# Patient Record
Sex: FEMALE | Race: WHITE | NOT HISPANIC OR LATINO | Employment: FULL TIME | URBAN - METROPOLITAN AREA
[De-identification: names, ages, dates, MRNs, and addresses within clinical notes are randomized per-mention and may not be internally consistent; named-entity substitution may affect disease eponyms.]

---

## 2017-03-21 ENCOUNTER — CONVERSION ENCOUNTER (OUTPATIENT)
Dept: GENERAL RADIOLOGY | Facility: HOSPITAL | Age: 57
End: 2017-03-21

## 2018-02-05 ENCOUNTER — OFFICE VISIT CONVERTED (OUTPATIENT)
Dept: ONCOLOGY | Facility: HOSPITAL | Age: 58
End: 2018-02-05
Attending: NURSE PRACTITIONER

## 2018-06-28 ENCOUNTER — CONVERSION ENCOUNTER (OUTPATIENT)
Dept: GENERAL RADIOLOGY | Facility: HOSPITAL | Age: 58
End: 2018-06-28

## 2020-03-20 ENCOUNTER — HOSPITAL ENCOUNTER (OUTPATIENT)
Dept: LAB | Facility: HOSPITAL | Age: 60
Discharge: HOME | End: 2020-03-20
Attending: PHYSICIAN ASSISTANT

## 2020-03-20 LAB
25(OH)D3 SERPL-MCNC: 53.9 NG/ML (ref 30–100)
ALBUMIN SERPL-MCNC: 3.8 G/DL (ref 3.5–5)
ALBUMIN/GLOB SERPL: 1.2 {RATIO} (ref 1.4–2.6)
ALP SERPL-CCNC: 133 U/L (ref 53–141)
ALT SERPL-CCNC: 19 U/L (ref 10–40)
ANION GAP SERPL CALC-SCNC: 17 MMOL/L (ref 8–19)
AST SERPL-CCNC: 28 U/L (ref 15–50)
BASOPHILS # BLD AUTO: 0.08 10*3/UL (ref 0–0.2)
BASOPHILS NFR BLD AUTO: 0.8 % (ref 0–3)
BILIRUB SERPL-MCNC: 0.57 MG/DL (ref 0.2–1.3)
BUN SERPL-MCNC: 26 MG/DL (ref 5–25)
BUN/CREAT SERPL: 28 {RATIO} (ref 6–20)
CALCIUM SERPL-MCNC: 9.7 MG/DL (ref 8.7–10.4)
CHLORIDE SERPL-SCNC: 98 MMOL/L (ref 99–111)
CHOLEST SERPL-MCNC: 181 MG/DL (ref 107–200)
CHOLEST/HDLC SERPL: 4.2 {RATIO} (ref 3–6)
CONV ABS IMM GRAN: 0.02 10*3/UL (ref 0–0.2)
CONV CO2: 27 MMOL/L (ref 22–32)
CONV IMMATURE GRAN: 0.2 % (ref 0–1.8)
CONV TOTAL PROTEIN: 7.1 G/DL (ref 6.3–8.2)
CREAT UR-MCNC: 0.93 MG/DL (ref 0.5–0.9)
DEPRECATED RDW RBC AUTO: 42.5 FL (ref 36.4–46.3)
EOSINOPHIL # BLD AUTO: 0.17 10*3/UL (ref 0–0.7)
EOSINOPHIL # BLD AUTO: 1.8 % (ref 0–7)
ERYTHROCYTE [DISTWIDTH] IN BLOOD BY AUTOMATED COUNT: 13.2 % (ref 11.7–14.4)
GFR SERPLBLD BASED ON 1.73 SQ M-ARVRAT: >60 ML/MIN/{1.73_M2}
GLOBULIN UR ELPH-MCNC: 3.3 G/DL (ref 2–3.5)
GLUCOSE SERPL-MCNC: 95 MG/DL (ref 65–99)
HCT VFR BLD AUTO: 49.5 % (ref 37–47)
HDLC SERPL-MCNC: 43 MG/DL (ref 40–60)
HGB BLD-MCNC: 16.3 G/DL (ref 12–16)
LDLC SERPL CALC-MCNC: 115 MG/DL (ref 70–100)
LYMPHOCYTES # BLD AUTO: 4.13 10*3/UL (ref 1–5)
LYMPHOCYTES NFR BLD AUTO: 43.5 % (ref 20–45)
MCH RBC QN AUTO: 28.9 PG (ref 27–31)
MCHC RBC AUTO-ENTMCNC: 32.9 G/DL (ref 33–37)
MCV RBC AUTO: 87.8 FL (ref 81–99)
MONOCYTES # BLD AUTO: 0.74 10*3/UL (ref 0.2–1.2)
MONOCYTES NFR BLD AUTO: 7.8 % (ref 3–10)
NEUTROPHILS # BLD AUTO: 4.35 10*3/UL (ref 2–8)
NEUTROPHILS NFR BLD AUTO: 45.9 % (ref 30–85)
NRBC CBCN: 0 % (ref 0–0.7)
OSMOLALITY SERPL CALC.SUM OF ELEC: 293 MOSM/KG (ref 273–304)
PLATELET # BLD AUTO: 270 10*3/UL (ref 130–400)
PMV BLD AUTO: 11.3 FL (ref 9.4–12.3)
POTASSIUM SERPL-SCNC: 3.2 MMOL/L (ref 3.5–5.3)
RBC # BLD AUTO: 5.64 10*6/UL (ref 4.2–5.4)
SODIUM SERPL-SCNC: 139 MMOL/L (ref 135–147)
T4 FREE SERPL-MCNC: 1.6 NG/DL (ref 0.9–1.8)
TRIGL SERPL-MCNC: 115 MG/DL (ref 40–150)
TSH SERPL-ACNC: 0.03 M[IU]/L (ref 0.27–4.2)
URATE SERPL-MCNC: 7.4 MG/DL (ref 2.5–7.5)
VIT B12 SERPL-MCNC: 1155 PG/ML (ref 211–911)
VLDLC SERPL-MCNC: 23 MG/DL (ref 5–37)
WBC # BLD AUTO: 9.49 10*3/UL (ref 4.8–10.8)

## 2020-03-23 LAB
CONV ANTI MICROSOMAL AB: <9 IU/ML (ref 0–34)
T3FREE SERPL-MCNC: 4.3 PG/ML (ref 2–4.4)
T3REVERSE SERPL-MCNC: 27.1 NG/DL (ref 9.2–24.1)
THYROGLOBULIN ANTIBODY: <1 IU/ML (ref 0–0.9)

## 2020-08-14 ENCOUNTER — HOSPITAL ENCOUNTER (OUTPATIENT)
Dept: LAB | Facility: HOSPITAL | Age: 60
Discharge: HOME | End: 2020-08-14
Attending: PHYSICIAN ASSISTANT

## 2020-08-14 LAB
T4 FREE SERPL-MCNC: 1.5 NG/DL (ref 0.9–1.8)
TSH SERPL-ACNC: 0.28 M[IU]/L (ref 0.27–4.2)

## 2020-08-17 LAB — T3FREE SERPL-MCNC: 2.9 PG/ML (ref 2–4.4)

## 2020-08-21 LAB — T3REVERSE SERPL-MCNC: 30 NG/DL (ref 9.2–24.1)

## 2020-08-31 ENCOUNTER — HOSPITAL ENCOUNTER (OUTPATIENT)
Dept: LAB | Facility: HOSPITAL | Age: 60
Discharge: HOME | End: 2020-08-31
Attending: PHYSICIAN ASSISTANT

## 2020-08-31 LAB
ASO AB SERPL-ACNC: 20 [IU]/ML (ref 0–200)
ERYTHROCYTE [SEDIMENTATION RATE] IN BLOOD: 20 MM/H (ref 0–30)

## 2020-09-01 LAB
DSDNA AB SER-ACNC: NEGATIVE [IU]/ML
ENA AB SER IA-ACNC: NEGATIVE {RATIO}
HCYS SERPL-SCNC: 13.3 UMOL/L (ref 3.7–13.9)

## 2020-09-02 LAB — CCP IGA+IGG SERPL IA-ACNC: 46 UNITS (ref 0–19)

## 2020-10-29 ENCOUNTER — HOSPITAL ENCOUNTER (OUTPATIENT)
Dept: LAB | Facility: HOSPITAL | Age: 60
Discharge: HOME | End: 2020-10-29
Attending: PHYSICIAN ASSISTANT

## 2020-10-29 LAB
25(OH)D3 SERPL-MCNC: 51.6 NG/ML (ref 30–100)
ALBUMIN SERPL-MCNC: 4 G/DL (ref 3.5–5)
ALBUMIN/GLOB SERPL: 1.3 {RATIO} (ref 1.4–2.6)
ALP SERPL-CCNC: 190 U/L (ref 53–141)
ALT SERPL-CCNC: 23 U/L (ref 10–40)
ANION GAP SERPL CALC-SCNC: 15 MMOL/L (ref 8–19)
AST SERPL-CCNC: 27 U/L (ref 15–50)
BASOPHILS # BLD AUTO: 0.09 10*3/UL (ref 0–0.2)
BASOPHILS NFR BLD AUTO: 0.9 % (ref 0–3)
BILIRUB SERPL-MCNC: 0.31 MG/DL (ref 0.2–1.3)
BUN SERPL-MCNC: 14 MG/DL (ref 5–25)
BUN/CREAT SERPL: 18 {RATIO} (ref 6–20)
CALCIUM SERPL-MCNC: 9.3 MG/DL (ref 8.7–10.4)
CHLORIDE SERPL-SCNC: 101 MMOL/L (ref 99–111)
CHOLEST SERPL-MCNC: 198 MG/DL (ref 107–200)
CHOLEST/HDLC SERPL: 4.6 {RATIO} (ref 3–6)
CONV ABS IMM GRAN: 0.02 10*3/UL (ref 0–0.2)
CONV CO2: 27 MMOL/L (ref 22–32)
CONV IMMATURE GRAN: 0.2 % (ref 0–1.8)
CONV TOTAL PROTEIN: 7.1 G/DL (ref 6.3–8.2)
CREAT UR-MCNC: 0.79 MG/DL (ref 0.5–0.9)
DEPRECATED RDW RBC AUTO: 43.9 FL (ref 36.4–46.3)
EOSINOPHIL # BLD AUTO: 0.91 10*3/UL (ref 0–0.7)
EOSINOPHIL # BLD AUTO: 9.4 % (ref 0–7)
ERYTHROCYTE [DISTWIDTH] IN BLOOD BY AUTOMATED COUNT: 13.5 % (ref 11.7–14.4)
GFR SERPLBLD BASED ON 1.73 SQ M-ARVRAT: >60 ML/MIN/{1.73_M2}
GLOBULIN UR ELPH-MCNC: 3.1 G/DL (ref 2–3.5)
GLUCOSE SERPL-MCNC: 106 MG/DL (ref 65–99)
HCT VFR BLD AUTO: 50.4 % (ref 37–47)
HDLC SERPL-MCNC: 43 MG/DL (ref 40–60)
HGB BLD-MCNC: 16.6 G/DL (ref 12–16)
LDLC SERPL CALC-MCNC: 123 MG/DL (ref 70–100)
LYMPHOCYTES # BLD AUTO: 3.67 10*3/UL (ref 1–5)
LYMPHOCYTES NFR BLD AUTO: 38.1 % (ref 20–45)
MCH RBC QN AUTO: 29.4 PG (ref 27–31)
MCHC RBC AUTO-ENTMCNC: 32.9 G/DL (ref 33–37)
MCV RBC AUTO: 89.2 FL (ref 81–99)
MONOCYTES # BLD AUTO: 0.8 10*3/UL (ref 0.2–1.2)
MONOCYTES NFR BLD AUTO: 8.3 % (ref 3–10)
NEUTROPHILS # BLD AUTO: 4.14 10*3/UL (ref 2–8)
NEUTROPHILS NFR BLD AUTO: 43.1 % (ref 30–85)
NRBC CBCN: 0 % (ref 0–0.7)
OSMOLALITY SERPL CALC.SUM OF ELEC: 289 MOSM/KG (ref 273–304)
PLATELET # BLD AUTO: 224 10*3/UL (ref 130–400)
PMV BLD AUTO: 11.1 FL (ref 9.4–12.3)
POTASSIUM SERPL-SCNC: 3.9 MMOL/L (ref 3.5–5.3)
RBC # BLD AUTO: 5.65 10*6/UL (ref 4.2–5.4)
SODIUM SERPL-SCNC: 139 MMOL/L (ref 135–147)
T4 FREE SERPL-MCNC: 1.3 NG/DL (ref 0.9–1.8)
TRIGL SERPL-MCNC: 162 MG/DL (ref 40–150)
TSH SERPL-ACNC: 0.8 M[IU]/L (ref 0.27–4.2)
URATE SERPL-MCNC: 4.7 MG/DL (ref 2.5–7.5)
VIT B12 SERPL-MCNC: 670 PG/ML (ref 211–911)
VLDLC SERPL-MCNC: 32 MG/DL (ref 5–37)
WBC # BLD AUTO: 9.63 10*3/UL (ref 4.8–10.8)

## 2020-10-30 LAB — T3FREE SERPL-MCNC: 2.9 PG/ML (ref 2–4.4)

## 2020-11-07 LAB — T3REVERSE SERPL-MCNC: 21.6 NG/DL (ref 9.2–24.1)

## 2020-12-17 ENCOUNTER — HOSPITAL ENCOUNTER (OUTPATIENT)
Dept: GENERAL RADIOLOGY | Facility: HOSPITAL | Age: 60
Discharge: HOME | End: 2020-12-17
Attending: INTERNAL MEDICINE

## 2021-05-22 ENCOUNTER — TRANSCRIBE ORDERS (OUTPATIENT)
Dept: ADMINISTRATIVE | Facility: HOSPITAL | Age: 61
End: 2021-05-22

## 2021-05-22 DIAGNOSIS — Z12.31 SCREENING MAMMOGRAM, ENCOUNTER FOR: Primary | ICD-10-CM

## 2021-05-28 VITALS
SYSTOLIC BLOOD PRESSURE: 134 MMHG | HEIGHT: 63 IN | WEIGHT: 211.64 LBS | BODY MASS INDEX: 37.5 KG/M2 | DIASTOLIC BLOOD PRESSURE: 70 MMHG | TEMPERATURE: 98 F | HEART RATE: 64 BPM | OXYGEN SATURATION: 96 %

## 2021-05-28 NOTE — H&P
Patient: LEDY PEREZ     Acct: [id number]     Report: #[id number]  UNIT #: [id number]     : 1960    Encounter Date:2018  PRIMARY CARE: DEBBIE MOLINA  ***Signed***  --------------------------------------------------------------------------------------------------------------------  Chief Complaint      2018      ERYTHROCYTOSIS            Vitals      Height 5 ft 3.39 in / 161 cm      Weight 211 lbs 10.266 oz / 96.0 kg      BSA 2.12 m2      BMI 37.0 kg/m2      Temperature 98.0 F / 36.67 C - Temporal      Pulse 64      Blood Pressure 134/70 Sitting, Right Arm      Pulse Oximetry 96%, ROOM AIR            General Information      Primary Provider:  DEBBIE MOLINA            Allergies      Coded Allergies:             SULFA (SULFONAMIDE ANTIBIOTICS) (Verified  Allergy, Unknown, 18)            Medications      Last Reconciled on 18 15:30 by TRISTAN BRADY      Cimetidine (Cimetidine) 400 Mg Tab      400 MG PO QDAY, #30 TAB         Reported         10/10/17       Citalopram HBr (CeleXA) 10 Mg Tablet      10 MG PO QDAY, #30 TAB 0 Refills         Reported         10/10/17       Levothyroxine (Synthroid) 50 Mcg Tablet      0.1 MG PO QDAY, #60 TAB 0 Refills         Reported         17       Valsartan/HCTZ (Valsartan /25 MG) 1 Each Tablet      1 TAB PO QDAY, TAB         Reported         17       Hctz/Bisoprolol (Ziac 10-6.25 Mg Tablet) Unknown Strength Tab      PO BID, TAB         Reported         16      Current Medications      Current Medications Reviewed 18            Pain and Fatigue Scales      Pain Assessment:           Experiencing any pain?:  No      Fatigue:           Experiencing any fatigue?:  No            Chemo Status      On Oral Chemotherapy?:  No            Other      Clinical Trial Participant?:  No            Past Medical History      Yes Thyroid Disease, Yes Hypertension, Yes High Cholesterol, Yes Depression,     Yes Other (ASTHMA)       Previous Blood Transfusion:  Prev Blood Transfusion,how many? (NONE)            Past Surgical History      REPORTS HX OF: Cholecystectomy, Other Past Surgical Hx (UTERINE ABLATION)            Social History      Yes            Tobacco Use      Tobacco status:  Never smoker            Alcohol Use      Alcohol intake:  None      Counseling given:  None            Substance Use      Substance use:  Denies use      Counseling given:  None            Past Oncology Illness History      PAST HEMATOLOGICAL HISTORY:      ERYTHROCYTOSIS            I have been asked to consult on Ms. Vira Yates who is a very pleasant 57-year    -old  female with erythrocytosis. She states that looking back she has     had an some level of erythrocytosis for at least 2 years. She does report some     increased fatigue but states that she feels this is likely due to a new blood     pressure medicine. She also has a history of moderate recurrent major     depression and anxiety disorder. Her most recent laboratory evaluation from 08/ 21/2017 revealed a white count of 9.4, platelet count of 256,000. This also     revealed an increased red cell mass with hemoglobin 17.6 and hematocrit 53%.     The patient denies any issues with sleep disturbance but does state she has     never been evaluated for sleep apnea. She denies any worsening shortness breath     and does state that she exercises daily with running. She denies any headache     or CNS symptoms. She denies any bleeding or bruising. She denies any new     medications other than valsartan.            She was referred to my clinic and I was asked to see her in consult for the     first time on 09/21/2017.            Presents in follow up on 10/10/17 for review of labs and reevaluation. CBC on 9/ 26/17 reveals WBC of 8.82 Hemoglobin 16.8 and Platelet count 205,000.     Erythropoietin level 8.9. BCR-Abl negative.  Tavares 2 mutation pending.             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  Denies headaches,     vision changes, leg pain.            ROS      General:  Denies: Appetite change, Fatigue, Weight loss      Eyes:  Denies: Blurred vision, Corrective lenses      Ears, nose, mouth, throat:  Denies: Headache, Seizures, Visual Changes      Cardiovascular:  Denies: Chest pain, Irregular heartbeat, Palpitations      Respiratory:  Denies: Shortness of Air, Productive cough      Gastrointestinal:  Denies: Nausea, Vomiting, Constipation, Diarrhea      Kidney/Bladder:  Denies: Painful Urination, Blood in urine      Musculoskeletal:  Denies: New Back pain, Muscle weakness      Skin:  DENIES: Easy Bleeding, Nail changes, Rash      Neurological:  Denies: Dizziness, Fainting, Numbness\Tingling      Psychiatric:  Complains of: AAO X 3, Denies: Anxiety, Panic attacks      Endocrine:  DiabetesThyroid Disorder      Hematologic/lymphatic:  Denies: Bruising, Bleeding      Reproductive:  Complains of Menopause, Denies Pregnant            General Appearance:  Alert, Oriented X3, No acute distress      Eyes:  Anicteric Sclerae, Moist Conjunctiva      HEENT:  Orophraynx clear, No Erythema      Neck:  Supple, Full ROM      Respiratory:  CTAB, No Crackels      Abdomen\Gastro:  Soft, No Masses      Cardio:  RRR, No Murmur, No, Peripheral Edema      Skin:  Normal Temperature, Normal Tone, No Rash, lesions, ulcers      Psychiatric:  Appropriate Affect, AAO x3      Neuro:  Cranial Nerve II-XII Inta, No Focal Sensory Deficit      Muscularskeletal:  Normal Gait and Station, Full ROM of extremeties      Extremities:  No Digital Cyanosis, No Digital Ischemia      Lymphatic:  No Cervical, No Supraclavicular, No Axillary            Current Plan      It was a pleasure meeting Ms. Yates and I appreciate the opportunity to     participate in her care. I have reviewed and summarized her  previous records     and labs as noted. It appears that she has an isolated erythrocytosis with     normal WBC and platelet. I did not identify any early precursors or evidence of     dysplasia. I did discuss with her the differential diagnosis of elevated red     cell mass including polycythemia, secondary erythrocytosis, etc. I counseled     her I would like to perform additional laboratory evaluation as noted below. I     did  her that if polycythemia vera as rule out we would likely need to     continue a workup for potential cause of secondary erythrocytosis if indicated.     She voiced understanding of this and was in agreement.            Labs reviewed at bedside. Tavares 2 mutation pending if this is negative will need     to investigate secondary causes such as asthma, COPD, CAROL, pulm disease, etc.             Current Plan: 2/5/18      Labs from today reviewed.  WBC 7.9 Hemoglobin 17.1 and Hematocrit 50.2      Platelet count 203,000.  Will refer for sleep study.  She will follow up in 3-4     months.             She was given time to ask questions and these questions were answered. She had     no additional questions or concerns and all questions were answered to her     satisfaction.            We will for immediate release. Please forward a copy of this dictation to Carlee Menchaca.      Erythrocytosis - D75.1            Abnormal CBC - R79.89            Notes      New Diagnostics      * Basic Sleep Study, SCHEDULED PROCEDURE       Dx: Anemia - D64.9      * CBC, As Soon As Possible       Dx: Anemia - D64.9      Follow-up:  1 Month      Next Visit Labs:  CBC, CMP      Intensive Monitor for Toxicity:  Labs Reviewed, Meds\Narcotics Reviewed      Review/Other:  Received Lab Test, Ordered Lab Test, Reviewed Medicine Test,     Obtained Old Records, Reviewed and Summarized Old Records      Attending      See the above note for details. I saw and evaluated the patient and agree with     the NP's findings and  plan as documented. I reviewed the review of systems and     past histories. I reviewed the patient's case and plan with the NP as noted.     Labs reviewed at bedside. Her ejection mutation was unremarkable and     erythropoietin level was normal. This is consistent with a secondary     erythrocytosis. I counseled her that we would now began to try to evaluate     potential causes or factors causing the secondary erythrocytosis. We will     initiate this workup with sleep study and this was ordered. She may also need     pulmonary referral for PFTs, etc. in the future.            Hx Influenza Vaccination:  No      Influenza Vaccine Declined:  Yes      2 or More Falls Past Year?:  No      Fall Past Year with Injury?:  No      Hx Pneumococcal Vaccination:  No      Encouraged to follow-up with:  PCP regarding preventative exams.      Chart initiated by      BEATRIZ DOBSON CMA                 Disclaimer: Converted document may not contain table formatting or lab diagrams. Please see [employer] for the authenticated document.

## 2021-07-13 ENCOUNTER — HOSPITAL ENCOUNTER (OUTPATIENT)
Dept: MAMMOGRAPHY | Facility: HOSPITAL | Age: 61
Discharge: HOME | End: 2021-07-13
Admitting: PHYSICIAN ASSISTANT

## 2021-07-13 DIAGNOSIS — Z12.31 SCREENING MAMMOGRAM, ENCOUNTER FOR: ICD-10-CM

## 2021-07-13 PROCEDURE — 77063 BREAST TOMOSYNTHESIS BI: CPT

## 2021-07-13 PROCEDURE — 77063 BREAST TOMOSYNTHESIS BI: CPT | Performed by: RADIOLOGY

## 2021-07-13 PROCEDURE — 77067 SCR MAMMO BI INCL CAD: CPT | Performed by: RADIOLOGY

## 2021-07-13 PROCEDURE — 77067 SCR MAMMO BI INCL CAD: CPT

## 2021-08-10 ENCOUNTER — LAB (OUTPATIENT)
Dept: LAB | Facility: HOSPITAL | Age: 61
End: 2021-08-10

## 2021-08-10 ENCOUNTER — TRANSCRIBE ORDERS (OUTPATIENT)
Dept: LAB | Facility: HOSPITAL | Age: 61
End: 2021-08-10

## 2021-08-10 DIAGNOSIS — R79.83 HOMOCYSTEINEMIA: ICD-10-CM

## 2021-08-10 DIAGNOSIS — I51.9 MYXEDEMA HEART DISEASE: ICD-10-CM

## 2021-08-10 DIAGNOSIS — E03.9 MYXEDEMA HEART DISEASE: ICD-10-CM

## 2021-08-10 DIAGNOSIS — R79.83 HOMOCYSTEINEMIA: Primary | ICD-10-CM

## 2021-08-10 PROCEDURE — 84144 ASSAY OF PROGESTERONE: CPT

## 2021-08-10 PROCEDURE — 86376 MICROSOMAL ANTIBODY EACH: CPT

## 2021-08-10 PROCEDURE — 84481 FREE ASSAY (FT-3): CPT

## 2021-08-10 PROCEDURE — 84443 ASSAY THYROID STIM HORMONE: CPT

## 2021-08-10 PROCEDURE — 36415 COLL VENOUS BLD VENIPUNCTURE: CPT

## 2021-08-10 PROCEDURE — 84403 ASSAY OF TOTAL TESTOSTERONE: CPT

## 2021-08-10 PROCEDURE — 84482 T3 REVERSE: CPT

## 2021-08-10 PROCEDURE — 83001 ASSAY OF GONADOTROPIN (FSH): CPT

## 2021-08-10 PROCEDURE — 84402 ASSAY OF FREE TESTOSTERONE: CPT

## 2021-08-10 PROCEDURE — 84439 ASSAY OF FREE THYROXINE: CPT

## 2021-08-10 PROCEDURE — 83090 ASSAY OF HOMOCYSTEINE: CPT

## 2021-08-10 PROCEDURE — 82670 ASSAY OF TOTAL ESTRADIOL: CPT

## 2021-08-10 PROCEDURE — 83002 ASSAY OF GONADOTROPIN (LH): CPT

## 2021-08-10 PROCEDURE — 86140 C-REACTIVE PROTEIN: CPT

## 2021-08-11 LAB
CRP SERPL-MCNC: 2.4 MG/DL (ref 0–0.5)
ESTRADIOL SERPL HS-MCNC: 22.3 PG/ML
FSH SERPL-ACNC: 69 MIU/ML
HCYS SERPL-MCNC: 12.2 UMOL/L (ref 0–15)
LH SERPL-ACNC: 45.7 MIU/ML
PROGEST SERPL-MCNC: 0.07 NG/ML
T3FREE SERPL-MCNC: 2.69 PG/ML (ref 2–4.4)
T4 FREE SERPL-MCNC: 1.31 NG/DL (ref 0.93–1.7)
TSH SERPL DL<=0.05 MIU/L-ACNC: 1.24 UIU/ML (ref 0.27–4.2)

## 2021-08-12 LAB — THYROPEROXIDASE AB SERPL-ACNC: 9 IU/ML (ref 0–34)

## 2021-08-13 LAB
TESTOST FREE SERPL-MCNC: 0.3 PG/ML (ref 0–4.2)
TESTOST SERPL-MCNC: 28 NG/DL (ref 4–50)

## 2021-08-14 LAB — T3REVERSE SERPL-MCNC: 32.9 NG/DL (ref 9.2–24.1)

## 2021-10-27 ENCOUNTER — LAB (OUTPATIENT)
Dept: LAB | Facility: HOSPITAL | Age: 61
End: 2021-10-27

## 2021-10-27 ENCOUNTER — TRANSCRIBE ORDERS (OUTPATIENT)
Dept: LAB | Facility: HOSPITAL | Age: 61
End: 2021-10-27

## 2021-10-27 DIAGNOSIS — E78.5 HYPERLIPIDEMIA, UNSPECIFIED HYPERLIPIDEMIA TYPE: ICD-10-CM

## 2021-10-27 DIAGNOSIS — E03.9 HYPOTHYROIDISM, UNSPECIFIED TYPE: ICD-10-CM

## 2021-10-27 DIAGNOSIS — K21.9 CARDIO-ESOPHAGEAL RELAXATION: ICD-10-CM

## 2021-10-27 DIAGNOSIS — R53.83 TIREDNESS: ICD-10-CM

## 2021-10-27 DIAGNOSIS — I10 ESSENTIAL HYPERTENSION, MALIGNANT: ICD-10-CM

## 2021-10-27 DIAGNOSIS — E72.11 ADENOSYLCOBALAMIN AND METHYLCOBALAMIN SYNTHESIS DEFECT (HCC): ICD-10-CM

## 2021-10-27 DIAGNOSIS — E55.9 VITAMIN D DEFICIENCY: Primary | ICD-10-CM

## 2021-10-27 DIAGNOSIS — E55.9 VITAMIN D DEFICIENCY: ICD-10-CM

## 2021-10-27 DIAGNOSIS — E71.120 ADENOSYLCOBALAMIN AND METHYLCOBALAMIN SYNTHESIS DEFECT (HCC): ICD-10-CM

## 2021-10-27 LAB
25(OH)D3 SERPL-MCNC: 52.3 NG/ML
ALBUMIN SERPL-MCNC: 3.9 G/DL (ref 3.5–5.2)
ALBUMIN/GLOB SERPL: 1.1 G/DL
ALP SERPL-CCNC: 135 U/L (ref 39–117)
ALT SERPL W P-5'-P-CCNC: 16 U/L (ref 1–33)
ANION GAP SERPL CALCULATED.3IONS-SCNC: 7.9 MMOL/L (ref 5–15)
AST SERPL-CCNC: 21 U/L (ref 1–32)
BASOPHILS # BLD AUTO: 0.07 10*3/MM3 (ref 0–0.2)
BASOPHILS NFR BLD AUTO: 0.8 % (ref 0–1.5)
BILIRUB SERPL-MCNC: 0.5 MG/DL (ref 0–1.2)
BUN SERPL-MCNC: 18 MG/DL (ref 6–20)
BUN/CREAT SERPL: 20.7 (ref 7–25)
CALCIUM SPEC-SCNC: 9.2 MG/DL (ref 8.6–10.5)
CHLORIDE SERPL-SCNC: 100 MMOL/L (ref 98–107)
CHOLEST SERPL-MCNC: 198 MG/DL (ref 0–200)
CO2 SERPL-SCNC: 27.1 MMOL/L (ref 22–29)
CREAT SERPL-MCNC: 0.87 MG/DL (ref 0.57–1)
DEPRECATED RDW RBC AUTO: 42.5 FL (ref 37–54)
EOSINOPHIL # BLD AUTO: 0.23 10*3/MM3 (ref 0–0.4)
EOSINOPHIL NFR BLD AUTO: 2.7 % (ref 0.3–6.2)
ERYTHROCYTE [DISTWIDTH] IN BLOOD BY AUTOMATED COUNT: 13 % (ref 12.3–15.4)
ESTRADIOL SERPL HS-MCNC: 29.3 PG/ML
FOLATE SERPL-MCNC: >20 NG/ML (ref 4.78–24.2)
FSH SERPL-ACNC: 64.2 MIU/ML
GFR SERPL CREATININE-BSD FRML MDRD: 67 ML/MIN/1.73
GLOBULIN UR ELPH-MCNC: 3.4 GM/DL
GLUCOSE SERPL-MCNC: 104 MG/DL (ref 65–99)
HBA1C MFR BLD: 6.62 % (ref 4.8–5.6)
HCT VFR BLD AUTO: 52.5 % (ref 34–46.6)
HDLC SERPL-MCNC: 39 MG/DL (ref 40–60)
HGB BLD-MCNC: 16.6 G/DL (ref 12–15.9)
IMM GRANULOCYTES # BLD AUTO: 0.02 10*3/MM3 (ref 0–0.05)
IMM GRANULOCYTES NFR BLD AUTO: 0.2 % (ref 0–0.5)
LDLC SERPL CALC-MCNC: 130 MG/DL (ref 0–100)
LDLC/HDLC SERPL: 3.24 {RATIO}
LH SERPL-ACNC: 56.5 MIU/ML
LYMPHOCYTES # BLD AUTO: 3.46 10*3/MM3 (ref 0.7–3.1)
LYMPHOCYTES NFR BLD AUTO: 40 % (ref 19.6–45.3)
MCH RBC QN AUTO: 28.3 PG (ref 26.6–33)
MCHC RBC AUTO-ENTMCNC: 31.6 G/DL (ref 31.5–35.7)
MCV RBC AUTO: 89.6 FL (ref 79–97)
MONOCYTES # BLD AUTO: 0.62 10*3/MM3 (ref 0.1–0.9)
MONOCYTES NFR BLD AUTO: 7.2 % (ref 5–12)
NEUTROPHILS NFR BLD AUTO: 4.24 10*3/MM3 (ref 1.7–7)
NEUTROPHILS NFR BLD AUTO: 49.1 % (ref 42.7–76)
NRBC BLD AUTO-RTO: 0 /100 WBC (ref 0–0.2)
PLATELET # BLD AUTO: 249 10*3/MM3 (ref 140–450)
PMV BLD AUTO: 10.8 FL (ref 6–12)
POTASSIUM SERPL-SCNC: 4.1 MMOL/L (ref 3.5–5.2)
PROGEST SERPL-MCNC: <0.05 NG/ML
PROT SERPL-MCNC: 7.3 G/DL (ref 6–8.5)
RBC # BLD AUTO: 5.86 10*6/MM3 (ref 3.77–5.28)
SODIUM SERPL-SCNC: 135 MMOL/L (ref 136–145)
T4 FREE SERPL-MCNC: 1.42 NG/DL (ref 0.93–1.7)
TRIGL SERPL-MCNC: 163 MG/DL (ref 0–150)
TSH SERPL DL<=0.05 MIU/L-ACNC: 0.91 UIU/ML (ref 0.27–4.2)
VIT B12 BLD-MCNC: 494 PG/ML (ref 211–946)
VLDLC SERPL-MCNC: 29 MG/DL (ref 5–40)
WBC # BLD AUTO: 8.64 10*3/MM3 (ref 3.4–10.8)

## 2021-10-27 PROCEDURE — 84439 ASSAY OF FREE THYROXINE: CPT

## 2021-10-27 PROCEDURE — 82306 VITAMIN D 25 HYDROXY: CPT

## 2021-10-27 PROCEDURE — 84443 ASSAY THYROID STIM HORMONE: CPT

## 2021-10-27 PROCEDURE — 83001 ASSAY OF GONADOTROPIN (FSH): CPT

## 2021-10-27 PROCEDURE — 84482 T3 REVERSE: CPT

## 2021-10-27 PROCEDURE — 84481 FREE ASSAY (FT-3): CPT

## 2021-10-27 PROCEDURE — 85025 COMPLETE CBC W/AUTO DIFF WBC: CPT

## 2021-10-27 PROCEDURE — 84403 ASSAY OF TOTAL TESTOSTERONE: CPT

## 2021-10-27 PROCEDURE — 86376 MICROSOMAL ANTIBODY EACH: CPT

## 2021-10-27 PROCEDURE — 36415 COLL VENOUS BLD VENIPUNCTURE: CPT

## 2021-10-27 PROCEDURE — 82670 ASSAY OF TOTAL ESTRADIOL: CPT

## 2021-10-27 PROCEDURE — 83002 ASSAY OF GONADOTROPIN (LH): CPT

## 2021-10-27 PROCEDURE — 82607 VITAMIN B-12: CPT

## 2021-10-27 PROCEDURE — 82746 ASSAY OF FOLIC ACID SERUM: CPT

## 2021-10-27 PROCEDURE — 84144 ASSAY OF PROGESTERONE: CPT

## 2021-10-27 PROCEDURE — 83525 ASSAY OF INSULIN: CPT

## 2021-10-27 PROCEDURE — 80053 COMPREHEN METABOLIC PANEL: CPT

## 2021-10-27 PROCEDURE — 83036 HEMOGLOBIN GLYCOSYLATED A1C: CPT

## 2021-10-27 PROCEDURE — 84480 ASSAY TRIIODOTHYRONINE (T3): CPT

## 2021-10-27 PROCEDURE — 82626 DEHYDROEPIANDROSTERONE: CPT

## 2021-10-27 PROCEDURE — 80061 LIPID PANEL: CPT

## 2021-10-28 LAB
INSULIN SERPL-ACNC: 38.3 UIU/ML (ref 2.6–24.9)
T3 SERPL-MCNC: 144 NG/DL (ref 71–180)
T3FREE SERPL-MCNC: 3.4 PG/ML (ref 2–4.4)
T3REVERSE SERPL-MCNC: NORMAL PG/ML
THYROPEROXIDASE AB SERPL-ACNC: <8 IU/ML (ref 0–34)

## 2021-10-29 ENCOUNTER — LAB (OUTPATIENT)
Dept: LAB | Facility: HOSPITAL | Age: 61
End: 2021-10-29

## 2021-10-29 ENCOUNTER — TRANSCRIBE ORDERS (OUTPATIENT)
Dept: LAB | Facility: HOSPITAL | Age: 61
End: 2021-10-29

## 2021-10-29 DIAGNOSIS — E03.9 HYPOTHYROIDISM, UNSPECIFIED TYPE: ICD-10-CM

## 2021-10-29 DIAGNOSIS — E03.9 HYPOTHYROIDISM, UNSPECIFIED TYPE: Primary | ICD-10-CM

## 2021-10-29 PROCEDURE — 84482 T3 REVERSE: CPT

## 2021-10-29 PROCEDURE — 36415 COLL VENOUS BLD VENIPUNCTURE: CPT

## 2021-10-30 LAB — DHEA SERPL-MCNC: 72 NG/DL (ref 31–701)

## 2021-11-02 LAB — TESTOST SERPL-MCNC: 38.5 NG/DL

## 2021-11-03 LAB — T3REVERSE SERPL-MCNC: 32.5 NG/DL (ref 9.2–24.1)

## 2022-03-21 ENCOUNTER — TRANSCRIBE ORDERS (OUTPATIENT)
Dept: LAB | Facility: HOSPITAL | Age: 62
End: 2022-03-21

## 2022-03-21 ENCOUNTER — LAB (OUTPATIENT)
Dept: LAB | Facility: HOSPITAL | Age: 62
End: 2022-03-21

## 2022-03-21 DIAGNOSIS — E03.9 HYPOTHYROIDISM, UNSPECIFIED TYPE: ICD-10-CM

## 2022-03-21 DIAGNOSIS — N95.1 SYMPTOMATIC MENOPAUSAL OR FEMALE CLIMACTERIC STATES: Primary | ICD-10-CM

## 2022-03-21 DIAGNOSIS — R53.83 FATIGUE, UNSPECIFIED TYPE: ICD-10-CM

## 2022-03-21 DIAGNOSIS — E11.9 DIABETES MELLITUS WITHOUT COMPLICATION: ICD-10-CM

## 2022-03-21 DIAGNOSIS — N95.1 SYMPTOMATIC MENOPAUSAL OR FEMALE CLIMACTERIC STATES: ICD-10-CM

## 2022-03-21 LAB
BASOPHILS # BLD AUTO: 0.08 10*3/MM3 (ref 0–0.2)
BASOPHILS NFR BLD AUTO: 0.8 % (ref 0–1.5)
CRP SERPL-MCNC: 1.83 MG/DL (ref 0–0.5)
DEPRECATED RDW RBC AUTO: 41.9 FL (ref 37–54)
EOSINOPHIL # BLD AUTO: 0.04 10*3/MM3 (ref 0–0.4)
EOSINOPHIL NFR BLD AUTO: 0.4 % (ref 0.3–6.2)
ERYTHROCYTE [DISTWIDTH] IN BLOOD BY AUTOMATED COUNT: 13.1 % (ref 12.3–15.4)
HBA1C MFR BLD: 6.8 % (ref 4.8–5.6)
HCT VFR BLD AUTO: 50.8 % (ref 34–46.6)
HGB BLD-MCNC: 16.8 G/DL (ref 12–15.9)
IMM GRANULOCYTES # BLD AUTO: 0.03 10*3/MM3 (ref 0–0.05)
IMM GRANULOCYTES NFR BLD AUTO: 0.3 % (ref 0–0.5)
LYMPHOCYTES # BLD AUTO: 4.25 10*3/MM3 (ref 0.7–3.1)
LYMPHOCYTES NFR BLD AUTO: 40.6 % (ref 19.6–45.3)
MAGNESIUM SERPL-MCNC: 2.4 MG/DL (ref 1.6–2.4)
MCH RBC QN AUTO: 28.9 PG (ref 26.6–33)
MCHC RBC AUTO-ENTMCNC: 33.1 G/DL (ref 31.5–35.7)
MCV RBC AUTO: 87.4 FL (ref 79–97)
MONOCYTES # BLD AUTO: 0.83 10*3/MM3 (ref 0.1–0.9)
MONOCYTES NFR BLD AUTO: 7.9 % (ref 5–12)
NEUTROPHILS NFR BLD AUTO: 5.25 10*3/MM3 (ref 1.7–7)
NEUTROPHILS NFR BLD AUTO: 50 % (ref 42.7–76)
NRBC BLD AUTO-RTO: 0 /100 WBC (ref 0–0.2)
PHOSPHATE SERPL-MCNC: 3.9 MG/DL (ref 2.5–4.5)
PLATELET # BLD AUTO: 267 10*3/MM3 (ref 140–450)
PMV BLD AUTO: 11 FL (ref 6–12)
RBC # BLD AUTO: 5.81 10*6/MM3 (ref 3.77–5.28)
T3FREE SERPL-MCNC: 2.82 PG/ML (ref 2–4.4)
T4 FREE SERPL-MCNC: 1.52 NG/DL (ref 0.93–1.7)
TSH SERPL DL<=0.05 MIU/L-ACNC: 0.69 UIU/ML (ref 0.27–4.2)
WBC NRBC COR # BLD: 10.48 10*3/MM3 (ref 3.4–10.8)

## 2022-03-21 PROCEDURE — 83735 ASSAY OF MAGNESIUM: CPT

## 2022-03-21 PROCEDURE — 36415 COLL VENOUS BLD VENIPUNCTURE: CPT

## 2022-03-21 PROCEDURE — 84100 ASSAY OF PHOSPHORUS: CPT

## 2022-03-21 PROCEDURE — 83036 HEMOGLOBIN GLYCOSYLATED A1C: CPT

## 2022-03-21 PROCEDURE — 86140 C-REACTIVE PROTEIN: CPT

## 2022-03-21 PROCEDURE — 86376 MICROSOMAL ANTIBODY EACH: CPT

## 2022-03-21 PROCEDURE — 84482 T3 REVERSE: CPT

## 2022-03-21 PROCEDURE — 84439 ASSAY OF FREE THYROXINE: CPT

## 2022-03-21 PROCEDURE — 85025 COMPLETE CBC W/AUTO DIFF WBC: CPT

## 2022-03-21 PROCEDURE — 84481 FREE ASSAY (FT-3): CPT

## 2022-03-21 PROCEDURE — 83525 ASSAY OF INSULIN: CPT

## 2022-03-21 PROCEDURE — 84443 ASSAY THYROID STIM HORMONE: CPT

## 2022-03-22 LAB
INSULIN SERPL-ACNC: 44.7 UIU/ML (ref 2.6–24.9)
THYROPEROXIDASE AB SERPL-ACNC: 11 IU/ML (ref 0–34)

## 2022-03-30 LAB — T3REVERSE SERPL-MCNC: 16.7 NG/DL (ref 9.2–24.1)

## 2022-06-01 ENCOUNTER — LAB (OUTPATIENT)
Dept: LAB | Facility: HOSPITAL | Age: 62
End: 2022-06-01

## 2022-06-01 ENCOUNTER — TRANSCRIBE ORDERS (OUTPATIENT)
Dept: LAB | Facility: HOSPITAL | Age: 62
End: 2022-06-01

## 2022-06-01 DIAGNOSIS — R73.9 HYPERGLYCEMIA: ICD-10-CM

## 2022-06-01 DIAGNOSIS — I10 HYPERTENSION, UNSPECIFIED TYPE: ICD-10-CM

## 2022-06-01 DIAGNOSIS — R73.9 HYPERGLYCEMIA: Primary | ICD-10-CM

## 2022-06-01 DIAGNOSIS — E03.9 HYPOTHYROIDISM, UNSPECIFIED TYPE: ICD-10-CM

## 2022-06-01 LAB
ALBUMIN SERPL-MCNC: 3.8 G/DL (ref 3.5–5.2)
ALBUMIN/GLOB SERPL: 1.4 G/DL
ALP SERPL-CCNC: 134 U/L (ref 39–117)
ALT SERPL W P-5'-P-CCNC: 16 U/L (ref 1–33)
ANION GAP SERPL CALCULATED.3IONS-SCNC: 9.8 MMOL/L (ref 5–15)
AST SERPL-CCNC: 20 U/L (ref 1–32)
BILIRUB SERPL-MCNC: 0.5 MG/DL (ref 0–1.2)
BUN SERPL-MCNC: 15 MG/DL (ref 8–23)
BUN/CREAT SERPL: 18.3 (ref 7–25)
CALCIUM SPEC-SCNC: 9.5 MG/DL (ref 8.6–10.5)
CHLORIDE SERPL-SCNC: 104 MMOL/L (ref 98–107)
CO2 SERPL-SCNC: 25.2 MMOL/L (ref 22–29)
CREAT SERPL-MCNC: 0.82 MG/DL (ref 0.57–1)
CRP SERPL-MCNC: 2.25 MG/DL (ref 0–0.5)
EGFRCR SERPLBLD CKD-EPI 2021: 82 ML/MIN/1.73
GLOBULIN UR ELPH-MCNC: 2.7 GM/DL
GLUCOSE SERPL-MCNC: 94 MG/DL (ref 65–99)
HBA1C MFR BLD: 6.7 % (ref 4.8–5.6)
POTASSIUM SERPL-SCNC: 3.8 MMOL/L (ref 3.5–5.2)
PROT SERPL-MCNC: 6.5 G/DL (ref 6–8.5)
SODIUM SERPL-SCNC: 139 MMOL/L (ref 136–145)
T3FREE SERPL-MCNC: 2.54 PG/ML (ref 2–4.4)
T4 FREE SERPL-MCNC: 1.34 NG/DL (ref 0.93–1.7)
TSH SERPL DL<=0.05 MIU/L-ACNC: 1.42 UIU/ML (ref 0.27–4.2)

## 2022-06-01 PROCEDURE — 86376 MICROSOMAL ANTIBODY EACH: CPT

## 2022-06-01 PROCEDURE — 84482 T3 REVERSE: CPT

## 2022-06-01 PROCEDURE — 83525 ASSAY OF INSULIN: CPT

## 2022-06-01 PROCEDURE — 84481 FREE ASSAY (FT-3): CPT

## 2022-06-01 PROCEDURE — 36415 COLL VENOUS BLD VENIPUNCTURE: CPT

## 2022-06-01 PROCEDURE — 83036 HEMOGLOBIN GLYCOSYLATED A1C: CPT

## 2022-06-01 PROCEDURE — 84443 ASSAY THYROID STIM HORMONE: CPT

## 2022-06-01 PROCEDURE — 86140 C-REACTIVE PROTEIN: CPT

## 2022-06-01 PROCEDURE — 80053 COMPREHEN METABOLIC PANEL: CPT

## 2022-06-01 PROCEDURE — 84439 ASSAY OF FREE THYROXINE: CPT

## 2022-06-02 LAB
INSULIN SERPL-ACNC: 36.3 UIU/ML (ref 2.6–24.9)
THYROPEROXIDASE AB SERPL-ACNC: <8 IU/ML (ref 0–34)

## 2022-06-12 LAB — T3REVERSE SERPL-MCNC: 28.3 NG/DL (ref 9.2–24.1)

## 2022-06-17 ENCOUNTER — TRANSCRIBE ORDERS (OUTPATIENT)
Dept: ADMINISTRATIVE | Facility: HOSPITAL | Age: 62
End: 2022-06-17

## 2022-06-17 DIAGNOSIS — Z12.31 SCREENING MAMMOGRAM, ENCOUNTER FOR: Primary | ICD-10-CM

## 2022-06-20 ENCOUNTER — TRANSCRIBE ORDERS (OUTPATIENT)
Dept: ADMINISTRATIVE | Facility: HOSPITAL | Age: 62
End: 2022-06-20

## 2022-06-20 DIAGNOSIS — E04.1 THYROID NODULE: Primary | ICD-10-CM

## 2022-06-27 ENCOUNTER — APPOINTMENT (OUTPATIENT)
Dept: ULTRASOUND IMAGING | Facility: HOSPITAL | Age: 62
End: 2022-06-27

## 2022-08-18 ENCOUNTER — HOSPITAL ENCOUNTER (OUTPATIENT)
Dept: MAMMOGRAPHY | Facility: HOSPITAL | Age: 62
Discharge: HOME | End: 2022-08-18
Admitting: PHYSICIAN ASSISTANT

## 2022-08-18 DIAGNOSIS — Z12.31 SCREENING MAMMOGRAM, ENCOUNTER FOR: ICD-10-CM

## 2022-08-18 PROCEDURE — 77063 BREAST TOMOSYNTHESIS BI: CPT

## 2022-08-18 PROCEDURE — 77067 SCR MAMMO BI INCL CAD: CPT

## 2022-12-30 NOTE — PROGRESS NOTES
Chief Complaint  Positive cologuard     Vira Yates is a 62 y.o. female who presents to [facility]- [facility] on referral from Carlee Menchaca PA-C for a gastroenterology evaluation of positive cologuard       History of Present Illness  New patient presents to the office for positive cologuard. Patient has never had a colonoscopy, she denies family history of colon cancer.  Denies change in bowel habits, constipation, diarrhea, abdominal pain, melena, hematochezia, and unintentional weight loss.  Denies upper GI symptoms such as heartburn, nausea, vomiting, dysphagia, and epigastric pain.  Overall patient has no GI complaints.    Cologuard 07/03/2022 - positive    Past Medical History:   Diagnosis Date   • GERD (gastroesophageal reflux disease)     Not sure   • Hypertension     At least 15 years ago       Past Surgical History:   Procedure Laterality Date   • CHOLECYSTECTOMY  1992   • TUBAL ABDOMINAL LIGATION  2005         Current Outpatient Medications:   •  Barberry-Oreg Grape-Goldenseal (BERBERINE COMPLEX PO), Take  by mouth., Disp: , Rfl:   •  bisoprolol-hydrochlorothiazide (ZIAC) 10-6.25 MG per tablet, 1 tab(s) orally twice daily for 90, Disp: , Rfl:   •  Calcium Citrate-Vitamin D (Calcium Citrate + D3) 200-6.25 MG-MCG tablet, , Disp: , Rfl:   •  CIMETIDINE 200 PO, , Disp: , Rfl:   •  Cranberry-Vitamin C (CRANBERRY CONCENTRATE/VITAMINC PO), , Disp: , Rfl:   •  Cyanocobalamin (Vitamin B 12) 500 MCG tablet, , Disp: , Rfl:   •  ergocalciferol (ERGOCALCIFEROL) 1.25 MG (03172 UT) capsule, 1 cap(s) orally once a week, Disp: , Rfl:   •  escitalopram (LEXAPRO) 10 MG tablet, Take 10 mg by mouth Daily., Disp: , Rfl:   •  Krill Oil 500 MG capsule, , Disp: , Rfl:   •  levothyroxine (SYNTHROID, LEVOTHROID) 125 MCG tablet, Take 125 mcg by mouth Daily., Disp: , Rfl:   •  losartan (COZAAR) 100 MG tablet, Daily., Disp: , Rfl:   •  Magnesium Citrate 100 MG tablet, , Disp: , Rfl:   •   NALTREXONE HCL PO, Take  by mouth. Low dose ointment, Disp: , Rfl:   •  Potassium 99 MG tablet, , Disp: , Rfl:   •  Sodium Sulfate-Mag Sulfate-KCl (Sutab) 2295-954-640 MG tablet, Take 12 tablets by mouth Take As Directed. Take 12 tablets at 6 pm and 12 tablets 4 hours prior to procedure., Disp: 24 tablet, Rfl: 0     Allergies   Allergen Reactions   • Sulfa Antibiotics Unknown - High Severity       Family History   Problem Relation Age of Onset   • Colon cancer Neg Hx         Social History     Social History Narrative   • Not on file       Immunization:  Immunization History   Administered Date(s) Administered   • COVID-19 (PFIZER) PURPLE CAP 05/23/2021, 06/17/2021        Objective     Vital Signs:   /70 (BP Location: Left arm, Patient Position: Sitting, Cuff Size: Adult)   Pulse 65   Ht 161 cm (63.39\")   Wt 114 kg (251 lb 1.6 oz)   SpO2 94%   BMI 43.94 kg/m²       Physical Exam  Constitutional:       Appearance: Normal appearance.   HENT:      Head: Normocephalic.   Cardiovascular:      Rate and Rhythm: Normal rate and regular rhythm.      Heart sounds: Normal heart sounds.   Pulmonary:      Effort: Pulmonary effort is normal.      Breath sounds: Normal breath sounds.   Abdominal:      General: Bowel sounds are normal.      Palpations: Abdomen is soft.   Skin:     General: Skin is warm and dry.   Neurological:      Mental Status: She is alert and oriented to person, place, and time. Mental status is at baseline.   Psychiatric:         Mood and Affect: Mood normal.         Behavior: Behavior normal.         Thought Content: Thought content normal.         Judgment: Judgment normal.         Result Review :                     Assessment and Plan    Diagnoses and all orders for this visit:    1. Positive colorectal cancer screening using Cologuard test (Primary)    Other orders  -     Sodium Sulfate-Mag Sulfate-KCl (Sutab) 1422-797-363 MG tablet; Take 12 tablets by mouth Take As Directed. Take 12 tablets at  6 pm and 12 tablets 4 hours prior to procedure.  Dispense: 24 tablet; Refill: 0    62-year-old new patient presents to the office with a history of positive Cologuard.  Patient has no GI complaints.  She has never had a colonoscopy before.  Denies family history of colon cancer.  I have advised patient proceed with colonoscopy, we will schedule at her earliest convenience.  Patient is agreeable to plan will call the office any questions or concerns.    COLONOSCOPY Surgical Risk and Benefits: Possible risk/complications, benefits, and alternatives to surgical or invasive procedure have been explained to patient and/or legal guardian. Risks include bleeding, infection, and perforation. Patient has been evaluated and can tolerate anesthesia and/or sedation. Risk, benefits, and alternatives to anesthesia and sedation have been explained to patient and/or legal guardian.     Follow Up   No follow-ups on file.  Patient was given instructions and counseling regarding her condition or for health maintenance advice. Please see specific information pulled into the AVS if appropriate.

## 2023-01-04 ENCOUNTER — PREP FOR SURGERY (OUTPATIENT)
Dept: OTHER | Facility: HOSPITAL | Age: 63
End: 2023-01-04
Payer: COMMERCIAL

## 2023-01-04 ENCOUNTER — OFFICE VISIT (OUTPATIENT)
Dept: GASTROENTEROLOGY | Facility: CLINIC | Age: 63
End: 2023-01-04
Payer: COMMERCIAL

## 2023-01-04 VITALS
BODY MASS INDEX: 44.49 KG/M2 | HEIGHT: 63 IN | WEIGHT: 251.1 LBS | SYSTOLIC BLOOD PRESSURE: 154 MMHG | OXYGEN SATURATION: 94 % | HEART RATE: 65 BPM | DIASTOLIC BLOOD PRESSURE: 70 MMHG

## 2023-01-04 DIAGNOSIS — R19.5 POSITIVE COLORECTAL CANCER SCREENING USING COLOGUARD TEST: Primary | ICD-10-CM

## 2023-01-04 PROCEDURE — 1159F MED LIST DOCD IN RCRD: CPT

## 2023-01-04 PROCEDURE — 1160F RVW MEDS BY RX/DR IN RCRD: CPT

## 2023-01-04 PROCEDURE — 99204 OFFICE O/P NEW MOD 45 MIN: CPT

## 2023-01-04 RX ORDER — LOSARTAN POTASSIUM 100 MG/1
100 TABLET ORAL DAILY
COMMUNITY

## 2023-01-04 RX ORDER — BISOPROLOL FUMARATE AND HYDROCHLOROTHIAZIDE 10; 6.25 MG/1; MG/1
1 TABLET ORAL 2 TIMES DAILY
COMMUNITY

## 2023-01-04 RX ORDER — KRILL/OM-3/DHA/EPA/PHOSPHO/AST 500MG-86MG
500 CAPSULE ORAL DAILY
COMMUNITY

## 2023-01-04 RX ORDER — PNV NO.95/FERROUS FUM/FOLIC AC 28MG-0.8MG
TABLET ORAL NIGHTLY
COMMUNITY

## 2023-01-04 RX ORDER — ESCITALOPRAM OXALATE 10 MG/1
10 TABLET ORAL NIGHTLY
COMMUNITY
Start: 2023-01-02

## 2023-01-04 RX ORDER — CYANOCOBALAMIN (VITAMIN B-12) 500 MCG
500 TABLET ORAL NIGHTLY
COMMUNITY

## 2023-01-04 RX ORDER — LEVOTHYROXINE SODIUM 0.12 MG/1
125 TABLET ORAL DAILY
COMMUNITY
Start: 2022-12-06

## 2023-01-04 RX ORDER — CALCIUM CARBONATE 260MG(650)
100 TABLET,CHEWABLE ORAL NIGHTLY
COMMUNITY

## 2023-01-04 RX ORDER — ERGOCALCIFEROL 1.25 MG/1
CAPSULE ORAL
COMMUNITY
End: 2023-03-15

## 2023-01-04 RX ORDER — SOD SULF/POT CHLORIDE/MAG SULF 1.479 G
12 TABLET ORAL TAKE AS DIRECTED
Qty: 24 TABLET | Refills: 0 | Status: ON HOLD | OUTPATIENT
Start: 2023-01-04 | End: 2023-03-20

## 2023-01-05 ENCOUNTER — PATIENT ROUNDING (BHMG ONLY) (OUTPATIENT)
Dept: GASTROENTEROLOGY | Facility: CLINIC | Age: 63
End: 2023-01-05
Payer: COMMERCIAL

## 2023-01-05 NOTE — PROGRESS NOTES
1/5/2023      Hello, may I speak with Vira Yates     My name is Micah. I am calling from [facility]. I show that you had a recent visit with Brittnee Colleen.    Before we get started may I verify your date of birth? 1960    I am calling to officially welcome you to our practice and ask about your recent visit. Is this a good time to talk? Yes.    Tell me about your visit with us. What things went well? \"Brittnee was very nice and informative. She made me feel comfortable about a colonoscopy especially considering my family history. I feel much better about my decision. The visit went very good.\"     We're always looking for ways to make our patients' experiences even better. Do you have recommendations on ways we may improve? No.     Overall were you satisfied with your first visit to our practice? Yes.     I appreciate you taking the time to speak with me today. Is there anything else I can do for you? Patient and I discussed questions she had regarding bowel prep and clear liquid diet instructions.     I am glad to hear that you had a very good visit and I appreciate you taking the time to provide feedback on this call. We would greatly appreciate you filling out a survey if you receive one in the mail, email or text. This is a great opportunity to provide any additional feedback that you may think of after this call as well.       Thank you, and have a great day.

## 2023-03-06 ENCOUNTER — TELEPHONE (OUTPATIENT)
Dept: GASTROENTEROLOGY | Facility: CLINIC | Age: 63
End: 2023-03-06
Payer: COMMERCIAL

## 2023-03-06 NOTE — TELEPHONE ENCOUNTER
I spoke with Ms Yates, confirmed her scheduled Colonoscopy on 03.20.23, estimated arrival time of 10:00am. Reminded of liquid diet the day prior. Reminded of bowel prep and instructions. Stated a hospital nurse will call to go over Rx's and confirm time.  Voiced understanding. [email or web address]

## 2023-03-15 NOTE — PRE-PROCEDURE INSTRUCTIONS
PT INSTRUCTED ON CLEAR LIQ DIET AND PO SPLIT PREP LAST BM SHOULD BE CLEAR  PT CAN TAKE synthroid    WITH A SIP OF WATER IN THE AM OF THE PROCEDURE ARRIVAL TIME IS 1000 am ON 3/20/23

## 2023-03-20 ENCOUNTER — ANESTHESIA EVENT (OUTPATIENT)
Dept: GASTROENTEROLOGY | Facility: HOSPITAL | Age: 63
End: 2023-03-20
Payer: COMMERCIAL

## 2023-03-20 ENCOUNTER — ANESTHESIA (OUTPATIENT)
Dept: GASTROENTEROLOGY | Facility: HOSPITAL | Age: 63
End: 2023-03-20
Payer: COMMERCIAL

## 2023-03-20 ENCOUNTER — HOSPITAL ENCOUNTER (OUTPATIENT)
Facility: HOSPITAL | Age: 63
Setting detail: HOSPITAL OUTPATIENT SURGERY
Discharge: HOME | End: 2023-03-20
Attending: INTERNAL MEDICINE | Admitting: INTERNAL MEDICINE
Payer: COMMERCIAL

## 2023-03-20 VITALS
BODY MASS INDEX: 43.75 KG/M2 | DIASTOLIC BLOOD PRESSURE: 82 MMHG | SYSTOLIC BLOOD PRESSURE: 114 MMHG | RESPIRATION RATE: 19 BRPM | HEART RATE: 60 BPM | WEIGHT: 246.91 LBS | TEMPERATURE: 97.3 F | OXYGEN SATURATION: 96 % | HEIGHT: 63 IN

## 2023-03-20 DIAGNOSIS — R19.5 POSITIVE COLORECTAL CANCER SCREENING USING COLOGUARD TEST: ICD-10-CM

## 2023-03-20 PROCEDURE — 45385 COLONOSCOPY W/LESION REMOVAL: CPT | Performed by: INTERNAL MEDICINE

## 2023-03-20 PROCEDURE — 88305 TISSUE EXAM BY PATHOLOGIST: CPT | Performed by: INTERNAL MEDICINE

## 2023-03-20 PROCEDURE — 25010000002 PROPOFOL 10 MG/ML EMULSION: Performed by: NURSE ANESTHETIST, CERTIFIED REGISTERED

## 2023-03-20 DEVICE — CLIPPING DEVICE
Type: IMPLANTABLE DEVICE | Site: COLON | Status: FUNCTIONAL
Brand: RESOLUTION CLIP

## 2023-03-20 RX ORDER — LIDOCAINE HYDROCHLORIDE 20 MG/ML
INJECTION, SOLUTION EPIDURAL; INFILTRATION; INTRACAUDAL; PERINEURAL AS NEEDED
Status: DISCONTINUED | OUTPATIENT
Start: 2023-03-20 | End: 2023-03-20 | Stop reason: SURG

## 2023-03-20 RX ORDER — PHENYLEPHRINE HCL IN 0.9% NACL 1 MG/10 ML
SYRINGE (ML) INTRAVENOUS AS NEEDED
Status: DISCONTINUED | OUTPATIENT
Start: 2023-03-20 | End: 2023-03-20 | Stop reason: SURG

## 2023-03-20 RX ORDER — PROPOFOL 10 MG/ML
VIAL (ML) INTRAVENOUS AS NEEDED
Status: DISCONTINUED | OUTPATIENT
Start: 2023-03-20 | End: 2023-03-20 | Stop reason: SURG

## 2023-03-20 RX ORDER — SODIUM CHLORIDE, SODIUM LACTATE, POTASSIUM CHLORIDE, CALCIUM CHLORIDE 600; 310; 30; 20 MG/100ML; MG/100ML; MG/100ML; MG/100ML
30 INJECTION, SOLUTION INTRAVENOUS CONTINUOUS
Status: DISCONTINUED | OUTPATIENT
Start: 2023-03-20 | End: 2023-03-20 | Stop reason: HOSPADM

## 2023-03-20 RX ADMIN — PROPOFOL 200 MCG/KG/MIN: 10 INJECTION, EMULSION INTRAVENOUS at 11:51

## 2023-03-20 RX ADMIN — LIDOCAINE HYDROCHLORIDE 100 MG: 20 INJECTION, SOLUTION EPIDURAL; INFILTRATION; INTRACAUDAL; PERINEURAL at 11:51

## 2023-03-20 RX ADMIN — PROPOFOL 100 MG: 10 INJECTION, EMULSION INTRAVENOUS at 11:51

## 2023-03-20 RX ADMIN — SODIUM CHLORIDE, POTASSIUM CHLORIDE, SODIUM LACTATE AND CALCIUM CHLORIDE 30 ML/HR: 600; 310; 30; 20 INJECTION, SOLUTION INTRAVENOUS at 10:48

## 2023-03-20 RX ADMIN — Medication 100 MCG: at 11:59

## 2023-03-20 NOTE — ANESTHESIA PREPROCEDURE EVALUATION
Anesthesia Evaluation     Patient summary reviewed and Nursing notes reviewed   no history of anesthetic complications:  NPO Solid Status: > 8 hours  NPO Liquid Status: > 2 hours           Airway   Mallampati: II  TM distance: >3 FB  Neck ROM: full  No difficulty expected  Dental    (+) partials    Pulmonary - negative pulmonary ROS and normal exam    breath sounds clear to auscultation  Cardiovascular - normal exam  Exercise tolerance: good (4-7 METS)    Rhythm: regular  Rate: normal    (+) hypertension,       Neuro/Psych- negative ROS  GI/Hepatic/Renal/Endo    (+) morbid obesity, GERD,  thyroid problem hypothyroidism    Musculoskeletal     Abdominal    Substance History      OB/GYN          Other   arthritis,      ROS/Med Hx Other: PAT Nursing Notes unavailable.                   Anesthesia Plan    ASA 3     general   total IV anesthesia    Anesthetic plan, risks, benefits, and alternatives have been provided, discussed and informed consent has been obtained with: patient.        CODE STATUS:

## 2023-03-20 NOTE — ANESTHESIA POSTPROCEDURE EVALUATION
Patient: Vira Yates    Procedure Summary     Date: 03/20/23 Room / Location: [facility] END[facility]COPY 2 / [facility]    Anesthesia Start: 1148 Anesthesia Stop: 1224    Procedure: COLONOSCOPY with polypectomy with hot/cold snares with resolution clip x's 1 Diagnosis:       Positive colorectal cancer screening using Cologuard test      (Positive colorectal cancer screening using Cologuard test [R19.5])    Surgeons: Idris Conner MD Provider: Tree Younger MD    Anesthesia Type: general ASA Status: 3          Anesthesia Type: general    Vitals  Vitals Value Taken Time   /82 03/20/23 1242   Temp 36.3 °C (97.3 °F) 03/20/23 1242   Pulse 60 03/20/23 1242   Resp 19 03/20/23 1242   SpO2 96 % 03/20/23 1242           Post Anesthesia Care and Evaluation    Patient location during evaluation: bedside  Patient participation: complete - patient participated  Level of consciousness: awake  Pain management: adequate    Airway patency: patent  Anesthetic complications: No anesthetic complications  PONV Status: none  Cardiovascular status: acceptable and stable  Respiratory status: acceptable  Hydration status: acceptable    Comments: An Anesthesiologist personally participated in the most demanding procedures (including induction and emergence if applicable) in the anesthesia plan, monitored the course of anesthesia administration at frequent intervals and remained physically present and available for immediate diagnosis and treatment of emergencies.

## 2023-03-20 NOTE — H&P
Pre Procedure History & Physical    Chief Complaint:   +cologuard    Subjective     HPI:   As above    Past Medical History:   Past Medical History:   Diagnosis Date   • Arthritis     oseto and rheumatoid   • Disease of thyroid gland    • GERD (gastroesophageal reflux disease)     Not sure   • Hypertension     At least 15 years ago   • PONV (postoperative nausea and vomiting)        Past Surgical History:  Past Surgical History:   Procedure Laterality Date   • CHOLECYSTECTOMY  1992   • ENDOMETRIAL ABLATION N/A    • TONSILLECTOMY     • TUBAL ABDOMINAL LIGATION  2005       Family History:  Family History   Problem Relation Age of Onset   • Colon cancer Neg Hx    • Malig Hyperthermia Neg Hx        Social History:   reports that she has never smoked. She has been exposed to tobacco smoke. She has never used smokeless tobacco. She reports that she does not drink alcohol and does not use drugs.    Medications:   Medications Prior to Admission   Medication Sig Dispense Refill Last Dose   • Barberry-Oreg Grape-Goldenseal (BERBERINE COMPLEX PO) Take 1 tablet by mouth 2 (Two) Times a Day.   Past Week   • bisoprolol-hydrochlorothiazide (ZIAC) 10-6.25 MG per tablet Take 1 tablet by mouth 2 (Two) Times a Day.   3/19/2023   • Calcium Citrate-Vitamin D (Calcium Citrate + D3) 200-6.25 MG-MCG tablet Every Night.   Past Week   • CIMETIDINE 200 PO Take 200 mg by mouth As Needed.   3/19/2023   • Cranberry-Vitamin C (CRANBERRY CONCENTRATE/VITAMINC PO) Daily.   Past Week   • Cyanocobalamin (Vitamin B 12) 500 MCG tablet 1 tablet Every Night.   Past Week   • escitalopram (LEXAPRO) 10 MG tablet Take 1 tablet by mouth Every Night.   3/19/2023   • Krill Oil 500 MG capsule Take 1 capsule by mouth Daily.   Past Week   • levothyroxine (SYNTHROID, LEVOTHROID) 125 MCG tablet Take 1 tablet by mouth Daily.   3/19/2023   • losartan (COZAAR) 100 MG tablet Take 1 tablet by mouth Daily.   3/19/2023   • Magnesium Citrate 100 MG tablet Take 100 mg by  "mouth Every Night.   Past Week   • NALTREXONE HCL PO Take  by mouth Daily. Low dose ointment   3/19/2023   • Potassium 99 MG tablet Take 99 mg by mouth Every Night.   Past Week       Allergies:  Sulfa antibiotics        Objective     Blood pressure 132/55, pulse 69, temperature 98.1 °F (36.7 °C), temperature source Temporal, resp. rate 18, height 161.1 cm (63.43\"), weight 112 kg (246 lb 14.6 oz), SpO2 96 %.    Physical Exam   Constitutional: Pt is oriented to person, place, and time and well-developed, well-nourished, and in no distress.   Mouth/Throat: Oropharynx is clear and moist.   Neck: Normal range of motion.   Cardiovascular: Normal rate, regular rhythm and normal heart sounds.    Pulmonary/Chest: Effort normal and breath sounds normal.   Abdominal: Soft. Nontender  Skin: Skin is warm and dry.   Psychiatric: Mood, memory, affect and judgment normal.     Assessment & Plan     Diagnosis:  +cologuard    Anticipated Surgical Procedure:  colonoscopy    The risks, benefits, and alternatives of this procedure have been discussed with the patient or the responsible party- the patient understands and agrees to proceed.          "

## 2023-03-22 LAB
CYTO UR: NORMAL
LAB AP CASE REPORT: NORMAL
LAB AP CLINICAL INFORMATION: NORMAL
PATH REPORT.FINAL DX SPEC: NORMAL
PATH REPORT.GROSS SPEC: NORMAL

## 2023-05-04 ENCOUNTER — TELEPHONE (OUTPATIENT)
Dept: GASTROENTEROLOGY | Facility: CLINIC | Age: 63
End: 2023-05-04
Payer: COMMERCIAL

## 2023-05-04 NOTE — TELEPHONE ENCOUNTER
Patient called the office stating she needed to discuss with someone regarding the way her colonoscopy was coded. I advised patient to contact the business office since we do not do any billing within our office. Patient states the billing office advised her to discuss Dr. oCnner's office. Patient had questions regarding why colonoscopy was coded as diagnostic. I advised her she was originally seen in office for a positive cologuard test, and also had polyps on her colonoscopy, deeming the colonoscopy diagnostic. Patient would like to speak to management regarding this issue.

## 2023-05-05 NOTE — TELEPHONE ENCOUNTER
Called patient regarding her billing concerns. I did relay to the patient what diagnosis code was used for the initial referral and on our providers documentation and informed her that we are unable to update this information. I did advise the patient to reach out to the billing department at [phone] for any further questions.

## 2023-10-30 ENCOUNTER — HOSPITAL ENCOUNTER (OUTPATIENT)
Dept: MAMMOGRAPHY | Facility: HOSPITAL | Age: 63
Discharge: HOME | End: 2023-10-30
Admitting: PHYSICIAN ASSISTANT
Payer: COMMERCIAL

## 2023-10-30 DIAGNOSIS — Z12.31 VISIT FOR SCREENING MAMMOGRAM: ICD-10-CM

## 2023-10-30 PROCEDURE — 77063 BREAST TOMOSYNTHESIS BI: CPT

## 2023-10-30 PROCEDURE — 77067 SCR MAMMO BI INCL CAD: CPT

## 2024-09-04 ENCOUNTER — TRANSCRIBE ORDERS (OUTPATIENT)
Dept: ADMINISTRATIVE | Facility: HOSPITAL | Age: 64
End: 2024-09-04
Payer: COMMERCIAL

## 2024-09-04 DIAGNOSIS — Z12.31 BREAST CANCER SCREENING BY MAMMOGRAM: Primary | ICD-10-CM

## 2024-11-01 ENCOUNTER — HOSPITAL ENCOUNTER (OUTPATIENT)
Dept: MAMMOGRAPHY | Facility: HOSPITAL | Age: 64
Discharge: HOME | End: 2024-11-01
Admitting: PHYSICIAN ASSISTANT
Payer: COMMERCIAL

## 2024-11-01 DIAGNOSIS — Z12.31 BREAST CANCER SCREENING BY MAMMOGRAM: ICD-10-CM

## 2024-11-01 PROCEDURE — 77063 BREAST TOMOSYNTHESIS BI: CPT

## 2024-11-01 PROCEDURE — 77067 SCR MAMMO BI INCL CAD: CPT

## 2025-04-04 ENCOUNTER — TRANSCRIBE ORDERS (OUTPATIENT)
Dept: GENERAL RADIOLOGY | Facility: HOSPITAL | Age: 65
End: 2025-04-04
Payer: COMMERCIAL

## 2025-04-04 ENCOUNTER — HOSPITAL ENCOUNTER (OUTPATIENT)
Dept: GENERAL RADIOLOGY | Facility: HOSPITAL | Age: 65
Discharge: HOME | End: 2025-04-04
Payer: COMMERCIAL

## 2025-04-04 DIAGNOSIS — H05.331 DEFORMITY OF RIGHT ORBIT DUE TO TRAUMA OR SURGERY: ICD-10-CM

## 2025-04-04 DIAGNOSIS — S09.93XA FACIAL INJURY, INITIAL ENCOUNTER: ICD-10-CM

## 2025-04-04 DIAGNOSIS — S09.93XA FACIAL INJURY, INITIAL ENCOUNTER: Primary | ICD-10-CM

## 2025-04-04 PROCEDURE — 70150 X-RAY EXAM OF FACIAL BONES: CPT

## 2025-04-04 PROCEDURE — 70200 X-RAY EXAM OF EYE SOCKETS: CPT

## 2025-05-30 ENCOUNTER — TRANSCRIBE ORDERS (OUTPATIENT)
Dept: ADMINISTRATIVE | Facility: HOSPITAL | Age: 65
End: 2025-05-30
Payer: COMMERCIAL

## 2025-05-30 DIAGNOSIS — S09.90XD RECENT HEAD TRAUMA, SUBSEQUENT ENCOUNTER: Primary | ICD-10-CM

## 2025-06-05 ENCOUNTER — HOSPITAL ENCOUNTER (OUTPATIENT)
Dept: CT IMAGING | Facility: HOSPITAL | Age: 65
Discharge: HOME | End: 2025-06-05
Admitting: PHYSICIAN ASSISTANT
Payer: COMMERCIAL

## 2025-06-05 DIAGNOSIS — S09.90XD RECENT HEAD TRAUMA, SUBSEQUENT ENCOUNTER: ICD-10-CM

## 2025-06-05 PROCEDURE — 70450 CT HEAD/BRAIN W/O DYE: CPT

## 2025-06-07 ENCOUNTER — APPOINTMENT (OUTPATIENT)
Dept: CT IMAGING | Facility: HOSPITAL | Age: 65
End: 2025-06-07
Payer: COMMERCIAL

## 2025-06-07 ENCOUNTER — HOSPITAL ENCOUNTER (EMERGENCY)
Facility: HOSPITAL | Age: 65
Discharge: HOME | End: 2025-06-07
Attending: EMERGENCY MEDICINE
Payer: COMMERCIAL

## 2025-06-07 VITALS
RESPIRATION RATE: 19 BRPM | TEMPERATURE: 97.8 F | OXYGEN SATURATION: 98 % | HEART RATE: 81 BPM | HEIGHT: 63 IN | SYSTOLIC BLOOD PRESSURE: 136 MMHG | DIASTOLIC BLOOD PRESSURE: 70 MMHG | BODY MASS INDEX: 28.79 KG/M2 | WEIGHT: 162.48 LBS

## 2025-06-07 DIAGNOSIS — K52.9 GASTROENTERITIS: Primary | ICD-10-CM

## 2025-06-07 LAB
ALBUMIN SERPL-MCNC: 4.1 G/DL (ref 3.5–5.2)
ALBUMIN/GLOB SERPL: 1.2 G/DL
ALP SERPL-CCNC: 129 U/L (ref 39–117)
ALT SERPL W P-5'-P-CCNC: 12 U/L (ref 1–33)
ANION GAP SERPL CALCULATED.3IONS-SCNC: 14 MMOL/L (ref 5–15)
AST SERPL-CCNC: 22 U/L (ref 1–32)
BACTERIA UR QL AUTO: ABNORMAL /HPF
BASOPHILS # BLD AUTO: 0.02 10*3/MM3 (ref 0–0.2)
BASOPHILS NFR BLD AUTO: 0.2 % (ref 0–1.5)
BILIRUB SERPL-MCNC: 0.9 MG/DL (ref 0–1.2)
BILIRUB UR QL STRIP: NEGATIVE
BUN SERPL-MCNC: 15.7 MG/DL (ref 8–23)
BUN/CREAT SERPL: 22.1 (ref 7–25)
CALCIUM SPEC-SCNC: 9.1 MG/DL (ref 8.6–10.5)
CHLORIDE SERPL-SCNC: 103 MMOL/L (ref 98–107)
CLARITY UR: CLEAR
CO2 SERPL-SCNC: 24 MMOL/L (ref 22–29)
COLOR UR: ABNORMAL
CREAT SERPL-MCNC: 0.71 MG/DL (ref 0.57–1)
D-LACTATE SERPL-SCNC: 1.7 MMOL/L (ref 0.5–2)
DEPRECATED RDW RBC AUTO: 42.2 FL (ref 37–54)
EGFRCR SERPLBLD CKD-EPI 2021: 95.7 ML/MIN/1.73
EOSINOPHIL # BLD AUTO: 0.16 10*3/MM3 (ref 0–0.4)
EOSINOPHIL NFR BLD AUTO: 1.6 % (ref 0.3–6.2)
ERYTHROCYTE [DISTWIDTH] IN BLOOD BY AUTOMATED COUNT: 12.9 % (ref 12.3–15.4)
GLOBULIN UR ELPH-MCNC: 3.4 GM/DL
GLUCOSE SERPL-MCNC: 92 MG/DL (ref 65–99)
GLUCOSE UR STRIP-MCNC: NEGATIVE MG/DL
HCT VFR BLD AUTO: 48.1 % (ref 34–46.6)
HGB BLD-MCNC: 16.2 G/DL (ref 12–15.9)
HGB UR QL STRIP.AUTO: NEGATIVE
HOLD SPECIMEN: NORMAL
HOLD SPECIMEN: NORMAL
HYALINE CASTS UR QL AUTO: ABNORMAL /LPF
IMM GRANULOCYTES # BLD AUTO: 0.03 10*3/MM3 (ref 0–0.05)
IMM GRANULOCYTES NFR BLD AUTO: 0.3 % (ref 0–0.5)
KETONES UR QL STRIP: ABNORMAL
LEUKOCYTE ESTERASE UR QL STRIP.AUTO: ABNORMAL
LIPASE SERPL-CCNC: 23 U/L (ref 13–60)
LYMPHOCYTES # BLD AUTO: 2.58 10*3/MM3 (ref 0.7–3.1)
LYMPHOCYTES NFR BLD AUTO: 25.3 % (ref 19.6–45.3)
MCH RBC QN AUTO: 30.1 PG (ref 26.6–33)
MCHC RBC AUTO-ENTMCNC: 33.7 G/DL (ref 31.5–35.7)
MCV RBC AUTO: 89.4 FL (ref 79–97)
MONOCYTES # BLD AUTO: 0.71 10*3/MM3 (ref 0.1–0.9)
MONOCYTES NFR BLD AUTO: 7 % (ref 5–12)
NEUTROPHILS NFR BLD AUTO: 6.7 10*3/MM3 (ref 1.7–7)
NEUTROPHILS NFR BLD AUTO: 65.6 % (ref 42.7–76)
NITRITE UR QL STRIP: NEGATIVE
NRBC BLD AUTO-RTO: 0 /100 WBC (ref 0–0.2)
PH UR STRIP.AUTO: 7.5 [PH] (ref 5–8)
PLATELET # BLD AUTO: 248 10*3/MM3 (ref 140–450)
PMV BLD AUTO: 10.4 FL (ref 6–12)
POTASSIUM SERPL-SCNC: 3.3 MMOL/L (ref 3.5–5.2)
PROT SERPL-MCNC: 7.5 G/DL (ref 6–8.5)
PROT UR QL STRIP: ABNORMAL
RBC # BLD AUTO: 5.38 10*6/MM3 (ref 3.77–5.28)
RBC # UR STRIP: ABNORMAL /HPF
REF LAB TEST METHOD: ABNORMAL
SODIUM SERPL-SCNC: 141 MMOL/L (ref 136–145)
SP GR UR STRIP: 1.02 (ref 1–1.03)
SQUAMOUS #/AREA URNS HPF: ABNORMAL /HPF
UROBILINOGEN UR QL STRIP: ABNORMAL
WBC # UR STRIP: ABNORMAL /HPF
WBC NRBC COR # BLD AUTO: 10.2 10*3/MM3 (ref 3.4–10.8)
WHOLE BLOOD HOLD COAG: NORMAL
WHOLE BLOOD HOLD SPECIMEN: NORMAL

## 2025-06-07 PROCEDURE — 81001 URINALYSIS AUTO W/SCOPE: CPT | Performed by: EMERGENCY MEDICINE

## 2025-06-07 PROCEDURE — 25810000003 SODIUM CHLORIDE 0.9 % SOLUTION

## 2025-06-07 PROCEDURE — 96374 THER/PROPH/DIAG INJ IV PUSH: CPT

## 2025-06-07 PROCEDURE — 85025 COMPLETE CBC W/AUTO DIFF WBC: CPT | Performed by: EMERGENCY MEDICINE

## 2025-06-07 PROCEDURE — 25510000001 IOPAMIDOL PER 1 ML: Performed by: EMERGENCY MEDICINE

## 2025-06-07 PROCEDURE — 96361 HYDRATE IV INFUSION ADD-ON: CPT

## 2025-06-07 PROCEDURE — 80053 COMPREHEN METABOLIC PANEL: CPT | Performed by: EMERGENCY MEDICINE

## 2025-06-07 PROCEDURE — 99285 EMERGENCY DEPT VISIT HI MDM: CPT

## 2025-06-07 PROCEDURE — 74177 CT ABD & PELVIS W/CONTRAST: CPT

## 2025-06-07 PROCEDURE — 83605 ASSAY OF LACTIC ACID: CPT | Performed by: EMERGENCY MEDICINE

## 2025-06-07 PROCEDURE — 83690 ASSAY OF LIPASE: CPT | Performed by: EMERGENCY MEDICINE

## 2025-06-07 PROCEDURE — 25010000002 ONDANSETRON PER 1 MG

## 2025-06-07 RX ORDER — LIOTHYRONINE SODIUM 5 UG/1
12.5 TABLET ORAL DAILY
COMMUNITY

## 2025-06-07 RX ORDER — IOPAMIDOL 755 MG/ML
100 INJECTION, SOLUTION INTRAVASCULAR
Status: COMPLETED | OUTPATIENT
Start: 2025-06-07 | End: 2025-06-07

## 2025-06-07 RX ORDER — FOLIC ACID 1 MG/1
1 TABLET ORAL DAILY
COMMUNITY

## 2025-06-07 RX ORDER — DICYCLOMINE HCL 20 MG
20 TABLET ORAL EVERY 6 HOURS
Qty: 21 TABLET | Refills: 0 | Status: SHIPPED | OUTPATIENT
Start: 2025-06-07 | End: 2025-06-07

## 2025-06-07 RX ORDER — SODIUM CHLORIDE 0.9 % (FLUSH) 0.9 %
10 SYRINGE (ML) INJECTION AS NEEDED
Status: DISCONTINUED | OUTPATIENT
Start: 2025-06-07 | End: 2025-06-07 | Stop reason: HOSPADM

## 2025-06-07 RX ORDER — TIRZEPATIDE 7.5 MG/.5ML
7.5 INJECTION, SOLUTION SUBCUTANEOUS
COMMUNITY

## 2025-06-07 RX ORDER — ONDANSETRON 2 MG/ML
4 INJECTION INTRAMUSCULAR; INTRAVENOUS ONCE
Status: COMPLETED | OUTPATIENT
Start: 2025-06-07 | End: 2025-06-07

## 2025-06-07 RX ORDER — ONDANSETRON 4 MG/1
4 TABLET, ORALLY DISINTEGRATING ORAL EVERY 6 HOURS PRN
Qty: 10 TABLET | Refills: 0 | Status: SHIPPED | OUTPATIENT
Start: 2025-06-07 | End: 2025-06-07

## 2025-06-07 RX ORDER — DICYCLOMINE HCL 20 MG
20 TABLET ORAL EVERY 6 HOURS
Qty: 21 TABLET | Refills: 0 | Status: SHIPPED | OUTPATIENT
Start: 2025-06-07

## 2025-06-07 RX ORDER — ONDANSETRON 4 MG/1
4 TABLET, ORALLY DISINTEGRATING ORAL EVERY 6 HOURS PRN
Qty: 10 TABLET | Refills: 0 | Status: SHIPPED | OUTPATIENT
Start: 2025-06-07

## 2025-06-07 RX ADMIN — ONDANSETRON 4 MG: 2 INJECTION INTRAMUSCULAR; INTRAVENOUS at 16:43

## 2025-06-07 RX ADMIN — SODIUM CHLORIDE 1000 ML: 9 INJECTION, SOLUTION INTRAVENOUS at 16:44

## 2025-06-07 RX ADMIN — IOPAMIDOL 100 ML: 755 INJECTION, SOLUTION INTRAVENOUS at 18:38

## 2025-06-07 NOTE — ED PROVIDER NOTES
"SHARED VISIT ATTESTATION:    This visit was performed by myself and an APC.  I personally approved the management plan/medical decision making and take responsibility for the patient management.      SHARED VISIT NOTE:    Patient is 65 y.o. year old female that presents to the ED for evaluation of nausea vomiting diarrhea.     Physical Exam    ED Course:    /69   Pulse 77   Temp 97.9 °F (36.6 °C) (Oral)   Resp 18   Ht 160 cm (63\")   Wt 73.7 kg (162 lb 7.7 oz)   SpO2 100%   BMI 28.78 kg/m²       The following orders were placed and all results were independently analyzed by me:  Orders Placed This Encounter   Procedures   • Enteric Bacterial Panel - Stool, Per Rectum   • CT Abdomen Pelvis With Contrast   • [place] Draw   • Comprehensive Metabolic Panel   • Lipase   • Urinalysis With Microscopic If Indicated (No Culture) - Urine, Clean Catch   • Lactic Acid, Plasma   • CBC Auto Differential   • Urinalysis, Microscopic Only - Urine, Clean Catch   • NPO Diet NPO Type: Strict NPO   • Undress & Gown   • Insert Peripheral IV   • CBC & Differential   • Green Top (Gel)   • Lavender Top   • Gold Top - SST   • Light Blue Top       Medications Given in the Emergency Department:  Medications   sodium chloride 0.9 % flush 10 mL (has no administration in time range)   sodium chloride 0.9 % bolus 1,000 mL (has no administration in time range)   ondansetron (ZOFRAN) injection 4 mg (has no administration in time range)        ED Course:    ED Course as of 06/08/25 0119   Sat Jun 07, 2025   1907 CT Abdomen Pelvis With Contrast  Impression:  1. No acute CT findings.  2. Colonic diverticulosis.  3. Hepatic steatosis.  4. Nonobstructing left renal calculi measuring up to 7 mm.  5. Multifibroid uterus.  6. Small incidental indeterminate left adrenal nodule measuring 1.1 cm. If there is no history of malignancy, 1 year follow-up adrenal protocol CT or MRI recommended to ensure stability per ACR guidelines.  7. Small hiatal " hernia.  8. Other ancillary findings as above. [MV]      ED Course User Index  [MV] García Zeng PA       Labs:    Lab Results (last 24 hours)       Procedure Component Value Units Date/Time    CBC & Differential [[id number]]  (Abnormal) Collected: 06/07/25 1621    Specimen: Blood Updated: 06/07/25 1630    Narrative:      The following orders were created for panel order CBC & Differential.  Procedure                               Abnormality         Status                     ---------                               -----------         ------                     CBC Auto Differential[[id number]]        Abnormal            Final result                 Please view results for these tests on the individual orders.    Comprehensive Metabolic Panel [[id number]] Collected: 06/07/25 1621    Specimen: Blood Updated: 06/07/25 1626    Lipase [[id number]] Collected: 06/07/25 1621    Specimen: Blood Updated: 06/07/25 1626    Urinalysis With Microscopic If Indicated (No Culture) - Urine, Clean Catch [[id number]]  (Abnormal) Collected: 06/07/25 1621    Specimen: Urine, Clean Catch Updated: 06/07/25 1636     Color, UA Dark Yellow     Appearance, UA Clear     pH, UA 7.5     Specific Gravity, UA 1.020     Glucose, UA Negative     Ketones, UA Trace     Bilirubin, UA Negative     Blood, UA Negative     Protein, UA Trace     Leuk Esterase, UA Moderate (2+)     Nitrite, UA Negative     Urobilinogen, UA 1.0 E.U./dL    Lactic Acid, Plasma [[id number]] Collected: 06/07/25 1621    Specimen: Blood Updated: 06/07/25 1626    CBC Auto Differential [[id number]]  (Abnormal) Collected: 06/07/25 1621    Specimen: Blood Updated: 06/07/25 1630     WBC 10.20 10*3/mm3      RBC 5.38 10*6/mm3      Hemoglobin 16.2 g/dL      Hematocrit 48.1 %      MCV 89.4 fL      MCH 30.1 pg      MCHC 33.7 g/dL      RDW 12.9 %      RDW-SD 42.2 fl      MPV 10.4 fL      Platelets 248 10*3/mm3      Neutrophil % 65.6 %      Lymphocyte % 25.3 %      Monocyte % 7.0 %      Eosinophil %  1.6 %      Basophil % 0.2 %      Immature Grans % 0.3 %      Neutrophils, Absolute 6.70 10*3/mm3      Lymphocytes, Absolute 2.58 10*3/mm3      Monocytes, Absolute 0.71 10*3/mm3      Eosinophils, Absolute 0.16 10*3/mm3      Basophils, Absolute 0.02 10*3/mm3      Immature Grans, Absolute 0.03 10*3/mm3      nRBC 0.0 /100 WBC     Urinalysis, Microscopic Only - Urine, Clean Catch [[id number]]  (Abnormal) Collected: 06/07/25 1621    Specimen: Urine, Clean Catch Updated: 06/07/25 1636     RBC, UA 0-2 /HPF      WBC, UA 11-20 /HPF      Bacteria, UA None Seen /HPF      Squamous Epithelial Cells, UA 3-6 /HPF      Hyaline Casts, UA 0-2 /LPF      Methodology Automated Microscopy             Imaging:    No Radiology Exams Resulted Within Past 24 Hours    MDM:    Procedures                         Harley Rodriguez MD  16:43 EDT  06/07/25         Harley Rodriguez MD  06/08/25 0119

## 2025-06-07 NOTE — ED NOTES
Pt unhooked so she could go to the bathroom. After the patient goes to the bathroom she is going to CT. emily Glasgow

## 2025-06-07 NOTE — ED PROVIDER NOTES
Time: 4:57 PM EDT  Date of encounter:  6/7/2025  Independent Historian/Clinical History and Information was obtained by:   Patient    History is limited by: N/A    Chief Complaint: Abdominal pain, nausea, vomiting, diarrhea      History of Present Illness:  Patient is a 65 y.o. year old female who presents to the emergency department for evaluation of abdominal pain, nausea, vomiting, diarrhea.  Patient states that she started having symptoms last night after eating a ham deli meat.  States that the meat is new.  States that she has had 4-5 vomiting episodes and has had 10-12 diarrhea.  She has taken an old prescription of Zofran and Bentyl.  States that she has a history of cholecystectomy, diverticulitis.    Patient Care Team  Primary Care Provider: Carlee Menchaca PA-C    Past Medical History:     Allergies   Allergen Reactions    Sulfa Antibiotics Unknown - High Severity     Past Medical History:   Diagnosis Date    Arthritis     oseto and rheumatoid    Disease of thyroid gland     GERD (gastroesophageal reflux disease)     Not sure    Hypertension     At least 15 years ago    PONV (postoperative nausea and vomiting)      Past Surgical History:   Procedure Laterality Date    CHOLECYSTECTOMY  1992    COLONOSCOPY N/A 3/20/2023    Procedure: COLONOSCOPY with polypectomy with hot/cold snares with resolution clip x's 1;  Surgeon: Idris Conner MD;  Location: [facility] ENDOSCOPY;  Service: Gastroenterology;  Laterality: N/A;  colon polyps, diverticulosis    ENDOMETRIAL ABLATION N/A     TONSILLECTOMY      TUBAL ABDOMINAL LIGATION  2005     Family History   Problem Relation Age of Onset    Colon cancer Neg Hx     Malig Hyperthermia Neg Hx        Home Medications:  Prior to Admission medications    Medication Sig Start Date End Date Taking? Authorizing Provider   Barberry-Oreg Grape-Goldenseal (BERBERINE COMPLEX PO) Take 1 tablet by mouth 2 (Two) Times a Day.    Provider, MD Flora    bisoprolol-hydrochlorothiazide (ZIAC) 10-6.25 MG per tablet Take 1 tablet by mouth 2 (Two) Times a Day.    Flora Mckinney MD   Calcium Citrate-Vitamin D (Calcium Citrate + D3) 200-6.25 MG-MCG tablet Every Night.    Flora Mckinney MD   CIMETIDINE 200 PO Take 200 mg by mouth As Needed.    Flora Mckinney MD   Cranberry-Vitamin C (CRANBERRY CONCENTRATE/VITAMINC PO) Daily.    Flora Mckinney MD   Cyanocobalamin (Vitamin B 12) 500 MCG tablet 1 tablet Every Night.    Flora Mckinney MD   escitalopram (LEXAPRO) 10 MG tablet Take 1 tablet by mouth Every Night. 1/2/23   Flora Mckinney MD   folic acid (FOLVITE) 1 MG tablet Take 1 tablet by mouth Daily.    Flora Mckinney MD   Krill Oil 500 MG capsule Take 1 capsule by mouth Daily.    Flora Mckinney MD   levothyroxine (SYNTHROID, LEVOTHROID) 125 MCG tablet Take 88 mcg by mouth Daily. 12/6/22   Flora Mckinney MD   liothyronine (CYTOMEL) 5 MCG tablet Take 2.5 tablets by mouth Daily.    Flora Mckinney MD   losartan (COZAAR) 100 MG tablet Take 1 tablet by mouth Daily.    Flora Mckinney MD   Magnesium Citrate 100 MG tablet Take 100 mg by mouth Every Night.    Flora Mckinney MD   NALTREXONE HCL PO Take  by mouth Daily. Low dose ointment    Flora Mckinney MD   Potassium 99 MG tablet Take 99 mg by mouth Every Night.    Flora Mckinney MD   Tirzepatide (Mounjaro) 7.5 MG/0.5ML solution auto-injector Inject 7.5 mg under the skin into the appropriate area as directed. Every 8-10 days    Flora Mckinney MD        Social History:   Social History     Tobacco Use    Smoking status: Never     Passive exposure: Past    Smokeless tobacco: Never   Vaping Use    Vaping status: Never Used   Substance Use Topics    Alcohol use: Never     Comment: social    Drug use: Never         Review of Systems:  Review of Systems   Constitutional:  Negative for chills and fever.   HENT:  Negative for ear pain.   "  Eyes:  Negative for pain.   Respiratory:  Negative for cough and shortness of breath.    Cardiovascular:  Negative for chest pain.   Gastrointestinal:  Positive for abdominal pain, diarrhea, nausea and vomiting.   Genitourinary:  Negative for dysuria.   Musculoskeletal:  Negative for arthralgias.   Skin:  Negative for rash.   Neurological:  Negative for headaches.        Physical Exam:  /70 (BP Location: Right arm, Patient Position: Lying)   Pulse 81   Temp 97.8 °F (36.6 °C) (Oral)   Resp 19   Ht 160 cm (63\")   Wt 73.7 kg (162 lb 7.7 oz)   SpO2 98%   BMI 28.78 kg/m²     Physical Exam  Vitals and nursing note reviewed.   Constitutional:       Appearance: Normal appearance.   HENT:      Head: Normocephalic and atraumatic.      Nose: Nose normal.   Eyes:      Extraocular Movements: Extraocular movements intact.      Conjunctiva/sclera: Conjunctivae normal.      Pupils: Pupils are equal, round, and reactive to light.   Cardiovascular:      Rate and Rhythm: Normal rate and regular rhythm.      Pulses: Normal pulses.      Heart sounds: Normal heart sounds.   Pulmonary:      Effort: Pulmonary effort is normal.      Breath sounds: Normal breath sounds.   Abdominal:      General: Abdomen is flat.      Palpations: Abdomen is soft.      Tenderness: There is generalized abdominal tenderness and tenderness in the left upper quadrant.   Musculoskeletal:         General: Normal range of motion.      Cervical back: Normal range of motion and neck supple.   Skin:     General: Skin is warm and dry.   Neurological:      General: No focal deficit present.      Mental Status: She is alert and oriented to person, place, and time.   Psychiatric:         Mood and Affect: Mood normal.         Behavior: Behavior normal.                    Medical Decision Making:      Comorbidities that affect care:    Hypertension, GERD, thyroid gland disease    External Notes reviewed:    None      The following orders were placed and all " results were independently analyzed by me:  Orders Placed This Encounter   Procedures    CT Abdomen Pelvis With Contrast    [place] Draw    Comprehensive Metabolic Panel    Lipase    Urinalysis With Microscopic If Indicated (No Culture) - Urine, Clean Catch    Lactic Acid, Plasma    CBC Auto Differential    Urinalysis, Microscopic Only - Urine, Clean Catch    NPO Diet NPO Type: Strict NPO    Undress & Gown    Insert Peripheral IV    CBC & Differential    Green Top (Gel)    Lavender Top    Gold Top - SST    Light Blue Top       Medications Given in the Emergency Department:  Medications   sodium chloride 0.9 % flush 10 mL (has no administration in time range)   sodium chloride 0.9 % bolus 1,000 mL (0 mL Intravenous Stopped 6/7/25 1746)   ondansetron (ZOFRAN) injection 4 mg (4 mg Intravenous Given 6/7/25 1643)   iopamidol (ISOVUE-370) 76 % injection 100 mL (100 mL Intravenous Given 6/7/25 1838)        ED Course:    ED Course as of 06/07/25 2059   Sat Jun 07, 2025   1907 CT Abdomen Pelvis With Contrast  Impression:  1. No acute CT findings.  2. Colonic diverticulosis.  3. Hepatic steatosis.  4. Nonobstructing left renal calculi measuring up to 7 mm.  5. Multifibroid uterus.  6. Small incidental indeterminate left adrenal nodule measuring 1.1 cm. If there is no history of malignancy, 1 year follow-up adrenal protocol CT or MRI recommended to ensure stability per ACR guidelines.  7. Small hiatal hernia.  8. Other ancillary findings as above. [MV]      ED Course User Index  [MV] García Zeng PA       Labs:    Lab Results (last 24 hours)       Procedure Component Value Units Date/Time    CBC & Differential [[id number]]  (Abnormal) Collected: 06/07/25 1621    Specimen: Blood Updated: 06/07/25 1630    Narrative:      The following orders were created for panel order CBC & Differential.  Procedure                               Abnormality         Status                     ---------                               -----------          ------                     CBC Auto Differential[[id number]]        Abnormal            Final result                 Please view results for these tests on the individual orders.    Comprehensive Metabolic Panel [[id number]]  (Abnormal) Collected: 06/07/25 1621    Specimen: Blood Updated: 06/07/25 1653     Glucose 92 mg/dL      BUN 15.7 mg/dL      Creatinine 0.71 mg/dL      Sodium 141 mmol/L      Potassium 3.3 mmol/L      Chloride 103 mmol/L      CO2 24.0 mmol/L      Calcium 9.1 mg/dL      Total Protein 7.5 g/dL      Albumin 4.1 g/dL      ALT (SGPT) 12 U/L      AST (SGOT) 22 U/L      Alkaline Phosphatase 129 U/L      Total Bilirubin 0.9 mg/dL      Globulin 3.4 gm/dL      A/G Ratio 1.2 g/dL      BUN/Creatinine Ratio 22.1     Anion Gap 14.0 mmol/L      eGFR 95.7 mL/min/1.73     Narrative:      GFR Categories in Chronic Kidney Disease (CKD)              GFR Category          GFR (mL/min/1.73)    Interpretation  G1                    90 or greater        Normal or high (1)  G2                    60-89                Mild decrease (1)  G3a                   45-59                Mild to moderate decrease  G3b                   30-44                Moderate to severe decrease  G4                    15-29                Severe decrease  G5                    14 or less           Kidney failure    (1)In the absence of evidence of kidney disease, neither GFR category G1 or G2 fulfill the criteria for CKD.    eGFR calculation 2021 CKD-EPI creatinine equation, which does not include race as a factor    Lipase [[id number]]  (Normal) Collected: 06/07/25 1621    Specimen: Blood Updated: 06/07/25 1653     Lipase 23 U/L     Urinalysis With Microscopic If Indicated (No Culture) - Urine, Clean Catch [[id number]]  (Abnormal) Collected: 06/07/25 1621    Specimen: Urine, Clean Catch Updated: 06/07/25 1636     Color, UA Dark Yellow     Appearance, UA Clear     pH, UA 7.5     Specific Gravity, UA 1.020     Glucose, UA Negative      Ketones, UA Trace     Bilirubin, UA Negative     Blood, UA Negative     Protein, UA Trace     Leuk Esterase, UA Moderate (2+)     Nitrite, UA Negative     Urobilinogen, UA 1.0 E.U./dL    Lactic Acid, Plasma [[id number]]  (Normal) Collected: 06/07/25 1621    Specimen: Blood Updated: 06/07/25 1649     Lactate 1.7 mmol/L     CBC Auto Differential [[id number]]  (Abnormal) Collected: 06/07/25 1621    Specimen: Blood Updated: 06/07/25 1630     WBC 10.20 10*3/mm3      RBC 5.38 10*6/mm3      Hemoglobin 16.2 g/dL      Hematocrit 48.1 %      MCV 89.4 fL      MCH 30.1 pg      MCHC 33.7 g/dL      RDW 12.9 %      RDW-SD 42.2 fl      MPV 10.4 fL      Platelets 248 10*3/mm3      Neutrophil % 65.6 %      Lymphocyte % 25.3 %      Monocyte % 7.0 %      Eosinophil % 1.6 %      Basophil % 0.2 %      Immature Grans % 0.3 %      Neutrophils, Absolute 6.70 10*3/mm3      Lymphocytes, Absolute 2.58 10*3/mm3      Monocytes, Absolute 0.71 10*3/mm3      Eosinophils, Absolute 0.16 10*3/mm3      Basophils, Absolute 0.02 10*3/mm3      Immature Grans, Absolute 0.03 10*3/mm3      nRBC 0.0 /100 WBC     Urinalysis, Microscopic Only - Urine, Clean Catch [[id number]]  (Abnormal) Collected: 06/07/25 1621    Specimen: Urine, Clean Catch Updated: 06/07/25 1636     RBC, UA 0-2 /HPF      WBC, UA 11-20 /HPF      Bacteria, UA None Seen /HPF      Squamous Epithelial Cells, UA 3-6 /HPF      Hyaline Casts, UA 0-2 /LPF      Methodology Automated Microscopy             Imaging:    CT Abdomen Pelvis With Contrast  Result Date: 6/7/2025  CT ABDOMEN PELVIS W CONTRAST Date of Exam: 6/7/2025 6:36 PM EDT Indication: LUQ pain, n/v/d, hx diverticulitis. Comparison: None available. Technique: Axial CT images were obtained of the abdomen and pelvis after the uneventful intravenous administration of iodinated contrast. Reconstructed coronal and sagittal images were also obtained. Automated exposure control and iterative construction methods were used. Findings: Heart size  within normal limits. Small hiatal hernia. No infectious appearing consolidation. Normal size and contour liver and spleen. Hepatic steatosis likely mild to moderate severity. Cholecystectomy. No dilation of biliary tree. Atrophic pancreas without active inflammation. 1.1 cm left adrenal nodule, indeterminate. Symmetric renal size, contour and enhancement. Nonobstructing left renal calculi measuring up to 7 mm in the left midpole 4 mm in the left inferior pole. Few small low-density lesions too small to accurately characterize favoring simple cyst. Urinary bladder unremarkable. Multifibroid uterus including degenerative partially calcified fibroid measuring up to 5.1 cm. No suspicious adnexal mass. Expected configuration stomach and duodenum. Colonic diverticulosis. No evidence of active inflammation or bowel obstruction. Nonspecific mixed liquid colonic stool. No evidence of acute appendicitis. Aortic atherosclerotic disease without aneurysmal dilation. No suspicious adenopathy. No ascites, free air or drainable fluid collection. Tiny fat-containing hernia near umbilicus. Degenerative change throughout the spine without acute displaced fracture or aggressive lesion. Few dense sclerotic lesions favoring bone islands.     Impression: 1. No acute CT findings. 2. Colonic diverticulosis. 3. Hepatic steatosis. 4. Nonobstructing left renal calculi measuring up to 7 mm. 5. Multifibroid uterus. 6. Small incidental indeterminate left adrenal nodule measuring 1.1 cm. If there is no history of malignancy, 1 year follow-up adrenal protocol CT or MRI recommended to ensure stability per ACR guidelines. 7. Small hiatal hernia. 8. Other ancillary findings as above. Electronically Signed: Harley Biggs MD  6/7/2025 7:01 PM EDT  Workstation ID: [id number]        Differential Diagnosis and Discussion:    Abdominal Pain: Based on the patient's signs and symptoms, I considered abdominal aortic aneurysm, small bowel obstruction,  pancreatitis, acute cholecystitis, acute appendecitis, peptic ulcer disease, gastritis, colitis, endocrine disorders, irritable bowel syndrome and other differential diagnosis an etiology of the patient's abdominal pain.  Diarrhea: Differential diagnosis includes but is not limited to malabsorption syndrome, bacterial infection, carcinoid syndrome, pancreatic hypersecretion, viral infection, celiac sprue, Crohn's disease, ulcerative colitis, ischemic colitis, colitis, hypermotility, and irritable bowel syndrome.    PROCEDURES:    Labs were collected in the emergency department and all labs were reviewed and interpreted by me.  CT scan was performed in the emergency department and the CT scan radiology impression was interpreted by me.    No orders to display       Procedures    MDM     Amount and/or Complexity of Data Reviewed  Clinical lab tests: reviewed and ordered  Tests in the radiology section of CPT®: ordered and reviewed    Risk of Complications, Morbidity, and/or Mortality  Presenting problems: moderate  Diagnostic procedures: moderate  Management options: low    Patient Progress  Patient progress: stable    Patient presents to the emergency department for evaluation of abdominal pain, nausea, vomiting, diarrhea.  Patient states that she started having symptoms last night after eating a ham deli meat.  States that the meat is new.  States that she has had 4-5 vomiting episodes and has had 10-12 diarrhea.  She has taken an old prescription of Zofran and Bentyl.  States that she has a history of cholecystectomy, diverticulitis.    The patient´s CBC that was reviewed and interpreted by me shows no abnormalities of critical concern. Of note, there is no anemia requiring a blood transfusion and the platelet count is acceptable.    The patient´s CMP that was reviewed and interpretted by me shows no abnormalities of critical concern.  The patient´s liver enzymes are unremarkable. The patient´s renal function  (creatinine) is preserved. The patient has a normal anion gap.    Sodium is normal at 141.  Potassium slightly low at 3.3.  This most likely from vomiting and diarrhea.  Will give the patient a few potassium pills and a bolus of fluids.    Lactate 1.7, lipase 23.    UA is negative for any leukocytes, nitrites, and bacteria.    CT Abdomen Pelvis With Contrast   Final Result   Impression:   1. No acute CT findings.   2. Colonic diverticulosis.   3. Hepatic steatosis.   4. Nonobstructing left renal calculi measuring up to 7 mm.   5. Multifibroid uterus.   6. Small incidental indeterminate left adrenal nodule measuring 1.1 cm. If there is no history of malignancy, 1 year follow-up adrenal protocol CT or MRI recommended to ensure stability per ACR guidelines.   7. Small hiatal hernia.   8. Other ancillary findings as above.            Electronically Signed: Harley Biggs MD     6/7/2025 7:01 PM EDT     Workstation ID: [id number]        Discussed the imaging findings with the patient.    The patient presents with vomiting and diarrhea consistent with gastroenteritis. The patient has a soft and benign abdominal exam. The patient is now resting comfortably and feels better, is alert, and is in no distress. The patient is able to tolerate po intake in the ED. The patient´s labs were reviewed and hydration status was assessed. The patient has no signs of acute renal failure, sepsis, or dehydration that warrants admission to the hospital. The vital signs have been stable. The patient's condition is stable and appropriate for discharge. The patient will pursue further outpatient evaluation with the primary care physician or designated physician. The patient and/or caregivers have expressed a clear and thorough understanding and agreed to follow-up as instructed.                Patient Care Considerations:    SEPSIS was considered but is NOT present in the emergency department as SIRS criteria is not  present.      Consultants/Shared Management Plan:    None    Social Determinants of Health:    Patient is independent, reliable, and has access to care.       Disposition and Care Coordination:    Discharged: I considered escalation of care by admitting this patient to the hospital, however patient is tolerating p.o. in the ED.  Patient has not had any vomiting episodes or diarrhea since zofran in the ED.    I have explained the patient´s condition, diagnoses and treatment plan based on the information available to me at this time. I have answered questions and addressed any concerns. The patient has a good  understanding of the patient´s diagnosis, condition, and treatment plan as can be expected at this point. The vital signs have been stable. The patient´s condition is stable and appropriate for discharge from the emergency department.      The patient will pursue further outpatient evaluation with the primary care physician or other designated or consulting physician as outlined in the discharge instructions. They are agreeable to this plan of care and follow-up instructions have been explained in detail. The patient has received these instructions in written format and has expressed an understanding of the discharge instructions. The patient is aware that any significant change in condition or worsening of symptoms should prompt an immediate return to this or the closest emergency department or call to 911.  I have explained discharge medications and the need for follow up with the patient/caretakers. This was also printed in the discharge instructions. Patient was discharged with the following medications and follow up:      Medication List        New Prescriptions      dicyclomine 20 MG tablet  Commonly known as: BENTYL  Take 1 tablet by mouth Every 6 (Six) Hours.     ondansetron ODT 4 MG disintegrating tablet  Commonly known as: ZOFRAN-ODT  Take 1 tablet by mouth Every 6 (Six) Hours As Needed for Nausea.                Where to Get Your Medications        These medications were sent to [facility]/pharmacy #[id number] - [place], [place] - [address] - [phone] [facility] [phone] FX  [address] [place] [address]      Hours: 24-hours Phone: [phone]   dicyclomine 20 MG tablet  ondansetron ODT 4 MG disintegrating tablet      No follow-up provider specified.     Final diagnoses:   Gastroenteritis        ED Disposition       ED Disposition   Discharge    Condition   Stable    Comment   --               This medical record created using voice recognition software.             García Zeng PA  06/07/25 1236

## 2025-06-08 NOTE — DISCHARGE INSTRUCTIONS
Patient's exam and symptoms are consistent with gastroenteritis. Continue with supportive care at home. Drink plenty of fluids especially fluids high in electrolytes such as Gatorade, Powerade, and/or Pedialyte. You can also take Pepto as needed for nausea and GI upset. You are being discharged home with zofran for nausea and bentyl for abdominal cramps.    As discussed, there are incidental findings on your CT abdomen today which are listed below:  Fatty Liver  7mm non-obstructing left kidney stone  1.1cm left adrenal glad.   Small Hiatal hernia  Colonic Diverticulosis    Follow up with your PCP in 3-5 days. You may need repeat imaging in 6-12 months to make sure your adrenal gland nodule is not getting bigger.    Return to the Emergency Department if you develop any uncontrollable fever, intractable pain, nausea, vomiting.

## 2025-06-26 NOTE — H&P (VIEW-ONLY)
Chief Complaint: Flank Pain    Subjective         History of Present Illness  Vira Yates is a 65 y.o. female presents to [facility] UROLOG[facility] to be seen for kidney stones.    History of Present Illness  The patient is a 65-year-old female who presents for evaluation of kidney stones.    She sought emergency care due to suspected viral or food poisoning, which could not be confirmed as she was unable to provide a stool sample. She experienced illness the previous night and felt the need for fluid intake around 3 PM, fearing she might faint. A CT scan revealed the presence of kidney stones, a condition she has never had before.     She reports mild urinary burning that started a few months ago and occasional back pain, which she attributes to her known back issues. She also experiences acid reflux symptoms. Over the past few months, she has experienced urinary incontinence, particularly when she needs to urinate urgently. She reports no presence of blood in her urine.    She has a history of frequent kidney infections during her childhood, requiring hospitalization and tetracycline treatment.     She does not have any heart or lung problems and is not on any blood thinners.     She has lost approximately 95 pounds over the past year and a half since starting Mounjaro for diabetes management.     She is currently reducing her Mounjaro dosage to 5 mg.    A spot on her adrenal gland was noted incidentally.    FAMILY HISTORY  Her father used to have kidney stones. Her brother  a few months ago from lung and liver cancer.      CT ABDOMEN PELVIS W CONTRAST     Date of Exam: 2025 6:36 PM EDT     Indication: LUQ pain, n/v/d, hx diverticulitis.     Comparison: None available.        Findings:  Heart size within normal limits. Small hiatal hernia. No infectious appearing consolidation. Normal size and contour liver and spleen. Hepatic steatosis likely mild to moderate severity. Cholecystectomy.  No dilation of biliary tree. Atrophic pancreas   without active inflammation. 1.1 cm left adrenal nodule, indeterminate.     Symmetric renal size, contour and enhancement. Nonobstructing left renal calculi measuring up to 7 mm in the left midpole 4 mm in the left inferior pole. Few small low-density lesions too small to accurately characterize favoring simple cyst. Urinary   bladder unremarkable. Multifibroid uterus including degenerative partially calcified fibroid measuring up to 5.1 cm. No suspicious adnexal mass.     Expected configuration stomach and duodenum. Colonic diverticulosis. No evidence of active inflammation or bowel obstruction. Nonspecific mixed liquid colonic stool. No evidence of acute appendicitis. Aortic atherosclerotic disease without aneurysmal   dilation.     No suspicious adenopathy. No ascites, free air or drainable fluid collection. Tiny fat-containing hernia near umbilicus. Degenerative change throughout the spine without acute displaced fracture or aggressive lesion. Few dense sclerotic lesions favoring   bone islands.     IMPRESSION:  Impression:  1. No acute CT findings.  2. Colonic diverticulosis.  3. Hepatic steatosis.  4. Nonobstructing left renal calculi measuring up to 7 mm.  5. Multifibroid uterus.  6. Small incidental indeterminate left adrenal nodule measuring 1.1 cm. If there is no history of malignancy, 1 year follow-up adrenal protocol CT or MRI recommended to ensure stability per ACR guidelines.  7. Small hiatal hernia.  8. Other ancillary findings as above.           Electronically Signed: Harley Biggs MD    6/7/2025 7:01 PM EDT     UA with microscopy  6/7/2025 RBC 0-2 per high-powered field    Objective     Past Medical History:   Diagnosis Date    Arthritis     oseto and rheumatoid    Disease of thyroid gland     GERD (gastroesophageal reflux disease)     Not sure    Hypertension     At least 15 years ago    PONV (postoperative nausea and vomiting)        Past  Surgical History:   Procedure Laterality Date    CHOLECYSTECTOMY  1992    COLONOSCOPY N/A 3/20/2023    Procedure: COLONOSCOPY with polypectomy with hot/cold snares with resolution clip x's 1;  Surgeon: Idris Conner MD;  Location: [facility] END[facility];  Service: Gastroenterology;  Laterality: N/A;  colon polyps, diverticulosis    ENDOMETRIAL ABLATION N/A     TONSILLECTOMY      TUBAL ABDOMINAL LIGATION  2005         Current Outpatient Medications:     bisoprolol-hydrochlorothiazide (ZIAC) 10-6.25 MG per tablet, Take 1 tablet by mouth 2 (Two) Times a Day., Disp: , Rfl:     Calcium Citrate-Vitamin D (Calcium Citrate + D3) 200-6.25 MG-MCG tablet, Every Night., Disp: , Rfl:     CIMETIDINE 200 PO, Take 200 mg by mouth As Needed., Disp: , Rfl:     Cranberry-Vitamin C (CRANBERRY CONCENTRATE/VITAMINC PO), Daily., Disp: , Rfl:     escitalopram (LEXAPRO) 10 MG tablet, Take 1 tablet by mouth Every Night., Disp: , Rfl:     folic acid (FOLVITE) 1 MG tablet, Take 1 tablet by mouth Daily., Disp: , Rfl:     Krill Oil 500 MG capsule, Take 1 capsule by mouth Daily., Disp: , Rfl:     levothyroxine (SYNTHROID, LEVOTHROID) 125 MCG tablet, Take 88 mcg by mouth Daily., Disp: , Rfl:     liothyronine (CYTOMEL) 5 MCG tablet, Take 2.5 tablets by mouth Daily., Disp: , Rfl:     losartan (COZAAR) 100 MG tablet, Take 1 tablet by mouth Daily., Disp: , Rfl:     Magnesium Citrate 100 MG tablet, Take 100 mg by mouth Every Night., Disp: , Rfl:     multivitamin with minerals (Multivitamin Adult) tablet tablet, Take  by mouth., Disp: , Rfl:     Potassium 99 MG tablet, Take 99 mg by mouth Every Night., Disp: , Rfl:     PreviDent 5000 Dry Mouth 1.1 % gel, APPLY A THIN RIBBON ON TOOTHBRUSH AND BRUSH ON TEETH FOR 3 MINUTES AND SPIT OUT, Disp: , Rfl:     saccharomyces boulardii (FLORASTOR) 250 MG capsule, Take 1 capsule by mouth Every 12 (Twelve) Hours., Disp: , Rfl:     Tirzepatide (Mounjaro) 7.5 MG/0.5ML solution auto-injector, Inject 7.5 mg under the  "skin into the appropriate area as directed. Every 8-10 days, Disp: , Rfl:     Turmeric Curcumin capsule, Take  by mouth., Disp: , Rfl:     Allergies   Allergen Reactions    Sulfa Antibiotics Unknown - High Severity        Family History   Problem Relation Age of Onset    Colon cancer Neg Hx     Malig Hyperthermia Neg Hx        Social History     Socioeconomic History    Marital status:    Tobacco Use    Smoking status: Never     Passive exposure: Past    Smokeless tobacco: Never   Vaping Use    Vaping status: Never Used   Substance and Sexual Activity    Alcohol use: Never     Comment: social    Drug use: Never    Sexual activity: Defer     Partners: Male     Birth control/protection: Pill       Vital Signs:   Resp 14   Ht 160 cm (63\")   Wt 73.7 kg (162 lb 7.7 oz)   BMI 28.78 kg/m²      Physical Exam  Vitals reviewed.   Constitutional:       Appearance: Normal appearance.   Neurological:      General: No focal deficit present.      Mental Status: She is alert and oriented to person, place, and time.   Psychiatric:         Mood and Affect: Mood normal.         Behavior: Behavior normal.          Result Review :   The following data was reviewed by: TRISTAN Stanley on 06/27/2025:  Results for orders placed or performed in visit on 06/27/25   POC Urinalysis Dipstick, Automated    Collection Time: 06/27/25  2:14 PM    Specimen: Urine   Result Value Ref Range    Color Yellow Yellow, Straw, Dark Yellow, Shara    Clarity, UA Clear Clear    Specific Gravity  1.020 1.005 - 1.030    pH, Urine 7.0 5.0 - 8.0    Leukocytes Trace (A) Negative    Nitrite, UA Negative Negative    Protein, POC Negative Negative mg/dL    Glucose, UA Negative Negative mg/dL    Ketones, UA Trace (A) Negative    Urobilinogen, UA 0.2 E.U./dL Normal, 0.2 E.U./dL    Bilirubin Negative Negative    Blood, UA Negative Negative    Lot Number [id number]     Expiration Date 3/26           Results        Procedures        Assessment and Plan  "   Diagnoses and all orders for this visit:    1. Kidney stone (Primary)  -     POC Urinalysis Dipstick, Automated  -     Case Request; Standing  -     sodium chloride 0.9 % flush 10 mL  -     sodium chloride 0.9 % flush 10 mL  -     sodium chloride 0.9 % infusion 40 mL  -     ceFAZolin (ANCEF) 2,000 mg in sodium chloride 0.9 % 100 mL IVPB  -     Case Request    2. Adrenal nodule  -     CT Abdomen Pelvis With & Without Contrast; Future    Other orders  -     Follow Anesthesia Guidelines / Protocol; Future  -     Follow Anesthesia Guidelines / Protocol; Standing  -     Provide NPO Instructions to Patient; Future  -     Insert Peripheral IV; Standing  -     Saline Lock & Maintain IV Access; Standing  -     Place Sequential Compression Device; Standing  -     Maintain Sequential Compression Device; Standing       Risk, benefits, and alternatives of the patient were discussed at length.  Risks including but not limited to bleeding, infection, damage to surrounding structure, pain, need for further procedures.  Also aware this is a procedure performed under anesthesia which has inherent risks including up to death.  Patient notes understanding, agreeable to proceed.       Assessment & Plan  1. Kidney stones.  The patient has a 7 mm kidney stone, which could remain asymptomatic for years unless it starts moving. She reports no current pain from the stones. A procedure to remove the stones has been scheduled for 07/22/2025. The procedure involves using a small camera to enter the bladder, ureter, and kidney, followed by laser fragmentation of the stones and removal of the pieces with a basket. A stent will be placed in the ureter for 3 to 7 days post-procedure. The stent may cause discomfort, but it is necessary to prevent kidney damage from swelling. If a string is left on the stent, she will be instructed on how to remove it; otherwise, a follow-up appointment will be scheduled for stent removal. She has been advised to  discontinue Mounjaro one week prior to the procedure and to abstain from food and drink after midnight on the day of the procedure, except for sips of water for medication intake. She will be contacted the day before the procedure with arrival time details. The patient has been informed about the risks and benefits of the procedure, including the discomfort associated with the stent and the necessity of the stent to prevent kidney damage.    2. Adrenal gland cyst.  A CT scan has been ordered for 07/2026 to monitor any changes or growth in the adrenal gland nodule. A follow-up appointment will be scheduled to discuss the results of this scan. The patient has been informed that adrenal gland nodules are often incidental findings and may not cause any issues, but monitoring is necessary to ensure there are no changes.    3. Urinary incontinence.  She reports occasional urinary incontinence over the past few months, particularly when she needs to urinate urgently. This can happen with aging. No specific treatment plan was discussed during the visit.      Follow Up   Return in about 1 year (around 6/27/2026) for with CT prior.  Patient was given instructions and counseling regarding her condition or for health maintenance advice. Please see specific information pulled into the AVS if appropriate.         This document has been electronically signed by TRISTAN Stanley  June 27, 2025 14:38 EDT     Patient or patient representative verbalized consent for the use of Ambient Listening during the visit with  TRISTAN Stanley for chart documentation. 6/27/2025  14:31 EDT

## 2025-06-26 NOTE — PROGRESS NOTES
Chief Complaint: Flank Pain    Subjective         History of Present Illness  Vira Yates is a 65 y.o. female presents to [facility] UROLOG[facility] to be seen for kidney stones.    History of Present Illness  The patient is a 65-year-old female who presents for evaluation of kidney stones.    She sought emergency care due to suspected viral or food poisoning, which could not be confirmed as she was unable to provide a stool sample. She experienced illness the previous night and felt the need for fluid intake around 3 PM, fearing she might faint. A CT scan revealed the presence of kidney stones, a condition she has never had before.     She reports mild urinary burning that started a few months ago and occasional back pain, which she attributes to her known back issues. She also experiences acid reflux symptoms. Over the past few months, she has experienced urinary incontinence, particularly when she needs to urinate urgently. She reports no presence of blood in her urine.    She has a history of frequent kidney infections during her childhood, requiring hospitalization and tetracycline treatment.     She does not have any heart or lung problems and is not on any blood thinners.     She has lost approximately 95 pounds over the past year and a half since starting Mounjaro for diabetes management.     She is currently reducing her Mounjaro dosage to 5 mg.    A spot on her adrenal gland was noted incidentally.    FAMILY HISTORY  Her father used to have kidney stones. Her brother  a few months ago from lung and liver cancer.      CT ABDOMEN PELVIS W CONTRAST     Date of Exam: 2025 6:36 PM EDT     Indication: LUQ pain, n/v/d, hx diverticulitis.     Comparison: None available.        Findings:  Heart size within normal limits. Small hiatal hernia. No infectious appearing consolidation. Normal size and contour liver and spleen. Hepatic steatosis likely mild to moderate severity. Cholecystectomy.  No dilation of biliary tree. Atrophic pancreas   without active inflammation. 1.1 cm left adrenal nodule, indeterminate.     Symmetric renal size, contour and enhancement. Nonobstructing left renal calculi measuring up to 7 mm in the left midpole 4 mm in the left inferior pole. Few small low-density lesions too small to accurately characterize favoring simple cyst. Urinary   bladder unremarkable. Multifibroid uterus including degenerative partially calcified fibroid measuring up to 5.1 cm. No suspicious adnexal mass.     Expected configuration stomach and duodenum. Colonic diverticulosis. No evidence of active inflammation or bowel obstruction. Nonspecific mixed liquid colonic stool. No evidence of acute appendicitis. Aortic atherosclerotic disease without aneurysmal   dilation.     No suspicious adenopathy. No ascites, free air or drainable fluid collection. Tiny fat-containing hernia near umbilicus. Degenerative change throughout the spine without acute displaced fracture or aggressive lesion. Few dense sclerotic lesions favoring   bone islands.     IMPRESSION:  Impression:  1. No acute CT findings.  2. Colonic diverticulosis.  3. Hepatic steatosis.  4. Nonobstructing left renal calculi measuring up to 7 mm.  5. Multifibroid uterus.  6. Small incidental indeterminate left adrenal nodule measuring 1.1 cm. If there is no history of malignancy, 1 year follow-up adrenal protocol CT or MRI recommended to ensure stability per ACR guidelines.  7. Small hiatal hernia.  8. Other ancillary findings as above.           Electronically Signed: Harley Biggs MD    6/7/2025 7:01 PM EDT     UA with microscopy  6/7/2025 RBC 0-2 per high-powered field    Objective     Past Medical History:   Diagnosis Date    Arthritis     oseto and rheumatoid    Disease of thyroid gland     GERD (gastroesophageal reflux disease)     Not sure    Hypertension     At least 15 years ago    PONV (postoperative nausea and vomiting)        Past  Surgical History:   Procedure Laterality Date    CHOLECYSTECTOMY  1992    COLONOSCOPY N/A 3/20/2023    Procedure: COLONOSCOPY with polypectomy with hot/cold snares with resolution clip x's 1;  Surgeon: Idris Conner MD;  Location: [facility] ENDOSCOPY;  Service: Gastroenterology;  Laterality: N/A;  colon polyps, diverticulosis    ENDOMETRIAL ABLATION N/A     TONSILLECTOMY      TUBAL ABDOMINAL LIGATION  2005         Current Outpatient Medications:     bisoprolol-hydrochlorothiazide (ZIAC) 10-6.25 MG per tablet, Take 1 tablet by mouth 2 (Two) Times a Day., Disp: , Rfl:     Calcium Citrate-Vitamin D (Calcium Citrate + D3) 200-6.25 MG-MCG tablet, Every Night., Disp: , Rfl:     CIMETIDINE 200 PO, Take 200 mg by mouth As Needed., Disp: , Rfl:     Cranberry-Vitamin C (CRANBERRY CONCENTRATE/VITAMINC PO), Daily., Disp: , Rfl:     escitalopram (LEXAPRO) 10 MG tablet, Take 1 tablet by mouth Every Night., Disp: , Rfl:     folic acid (FOLVITE) 1 MG tablet, Take 1 tablet by mouth Daily., Disp: , Rfl:     Krill Oil 500 MG capsule, Take 1 capsule by mouth Daily., Disp: , Rfl:     levothyroxine (SYNTHROID, LEVOTHROID) 125 MCG tablet, Take 88 mcg by mouth Daily., Disp: , Rfl:     liothyronine (CYTOMEL) 5 MCG tablet, Take 2.5 tablets by mouth Daily., Disp: , Rfl:     losartan (COZAAR) 100 MG tablet, Take 1 tablet by mouth Daily., Disp: , Rfl:     Magnesium Citrate 100 MG tablet, Take 100 mg by mouth Every Night., Disp: , Rfl:     multivitamin with minerals (Multivitamin Adult) tablet tablet, Take  by mouth., Disp: , Rfl:     Potassium 99 MG tablet, Take 99 mg by mouth Every Night., Disp: , Rfl:     PreviDent 5000 Dry Mouth 1.1 % gel, APPLY A THIN RIBBON ON TOOTHBRUSH AND BRUSH ON TEETH FOR 3 MINUTES AND SPIT OUT, Disp: , Rfl:     saccharomyces boulardii (FLORASTOR) 250 MG capsule, Take 1 capsule by mouth Every 12 (Twelve) Hours., Disp: , Rfl:     Tirzepatide (Mounjaro) 7.5 MG/0.5ML solution auto-injector, Inject 7.5 mg under the  "skin into the appropriate area as directed. Every 8-10 days, Disp: , Rfl:     Turmeric Curcumin capsule, Take  by mouth., Disp: , Rfl:     Allergies   Allergen Reactions    Sulfa Antibiotics Unknown - High Severity        Family History   Problem Relation Age of Onset    Colon cancer Neg Hx     Malig Hyperthermia Neg Hx        Social History     Socioeconomic History    Marital status:    Tobacco Use    Smoking status: Never     Passive exposure: Past    Smokeless tobacco: Never   Vaping Use    Vaping status: Never Used   Substance and Sexual Activity    Alcohol use: Never     Comment: social    Drug use: Never    Sexual activity: Defer     Partners: Male     Birth control/protection: Pill       Vital Signs:   Resp 14   Ht 160 cm (63\")   Wt 73.7 kg (162 lb 7.7 oz)   BMI 28.78 kg/m²      Physical Exam  Vitals reviewed.   Constitutional:       Appearance: Normal appearance.   Neurological:      General: No focal deficit present.      Mental Status: She is alert and oriented to person, place, and time.   Psychiatric:         Mood and Affect: Mood normal.         Behavior: Behavior normal.          Result Review :   The following data was reviewed by: TRISTAN Stanley on 06/27/2025:  Results for orders placed or performed in visit on 06/27/25   POC Urinalysis Dipstick, Automated    Collection Time: 06/27/25  2:14 PM    Specimen: Urine   Result Value Ref Range    Color Yellow Yellow, Straw, Dark Yellow, Shara    Clarity, UA Clear Clear    Specific Gravity  1.020 1.005 - 1.030    pH, Urine 7.0 5.0 - 8.0    Leukocytes Trace (A) Negative    Nitrite, UA Negative Negative    Protein, POC Negative Negative mg/dL    Glucose, UA Negative Negative mg/dL    Ketones, UA Trace (A) Negative    Urobilinogen, UA 0.2 E.U./dL Normal, 0.2 E.U./dL    Bilirubin Negative Negative    Blood, UA Negative Negative    Lot Number [id number]     Expiration Date 3/26           Results        Procedures        Assessment and Plan  "   Diagnoses and all orders for this visit:    1. Kidney stone (Primary)  -     POC Urinalysis Dipstick, Automated  -     Case Request; Standing  -     sodium chloride 0.9 % flush 10 mL  -     sodium chloride 0.9 % flush 10 mL  -     sodium chloride 0.9 % infusion 40 mL  -     ceFAZolin (ANCEF) 2,000 mg in sodium chloride 0.9 % 100 mL IVPB  -     Case Request    2. Adrenal nodule  -     CT Abdomen Pelvis With & Without Contrast; Future    Other orders  -     Follow Anesthesia Guidelines / Protocol; Future  -     Follow Anesthesia Guidelines / Protocol; Standing  -     Provide NPO Instructions to Patient; Future  -     Insert Peripheral IV; Standing  -     Saline Lock & Maintain IV Access; Standing  -     Place Sequential Compression Device; Standing  -     Maintain Sequential Compression Device; Standing       Risk, benefits, and alternatives of the patient were discussed at length.  Risks including but not limited to bleeding, infection, damage to surrounding structure, pain, need for further procedures.  Also aware this is a procedure performed under anesthesia which has inherent risks including up to death.  Patient notes understanding, agreeable to proceed.       Assessment & Plan  1. Kidney stones.  The patient has a 7 mm kidney stone, which could remain asymptomatic for years unless it starts moving. She reports no current pain from the stones. A procedure to remove the stones has been scheduled for 07/22/2025. The procedure involves using a small camera to enter the bladder, ureter, and kidney, followed by laser fragmentation of the stones and removal of the pieces with a basket. A stent will be placed in the ureter for 3 to 7 days post-procedure. The stent may cause discomfort, but it is necessary to prevent kidney damage from swelling. If a string is left on the stent, she will be instructed on how to remove it; otherwise, a follow-up appointment will be scheduled for stent removal. She has been advised to  discontinue Mounjaro one week prior to the procedure and to abstain from food and drink after midnight on the day of the procedure, except for sips of water for medication intake. She will be contacted the day before the procedure with arrival time details. The patient has been informed about the risks and benefits of the procedure, including the discomfort associated with the stent and the necessity of the stent to prevent kidney damage.    2. Adrenal gland cyst.  A CT scan has been ordered for 07/2026 to monitor any changes or growth in the adrenal gland nodule. A follow-up appointment will be scheduled to discuss the results of this scan. The patient has been informed that adrenal gland nodules are often incidental findings and may not cause any issues, but monitoring is necessary to ensure there are no changes.    3. Urinary incontinence.  She reports occasional urinary incontinence over the past few months, particularly when she needs to urinate urgently. This can happen with aging. No specific treatment plan was discussed during the visit.      Follow Up   Return in about 1 year (around 6/27/2026) for with CT prior.  Patient was given instructions and counseling regarding her condition or for health maintenance advice. Please see specific information pulled into the AVS if appropriate.         This document has been electronically signed by TRISTAN Stanley  June 27, 2025 14:38 EDT     Patient or patient representative verbalized consent for the use of Ambient Listening during the visit with  TRISTAN Stanley for chart documentation. 6/27/2025  14:31 EDT

## 2025-06-27 ENCOUNTER — OFFICE VISIT (OUTPATIENT)
Dept: UROLOGY | Age: 65
End: 2025-06-27
Payer: COMMERCIAL

## 2025-06-27 VITALS — WEIGHT: 162.48 LBS | BODY MASS INDEX: 28.79 KG/M2 | HEIGHT: 63 IN | RESPIRATION RATE: 14 BRPM

## 2025-06-27 DIAGNOSIS — N20.0 KIDNEY STONE: Primary | ICD-10-CM

## 2025-06-27 DIAGNOSIS — E27.9 ADRENAL NODULE: ICD-10-CM

## 2025-06-27 LAB
BILIRUB BLD-MCNC: NEGATIVE MG/DL
CLARITY, POC: CLEAR
COLOR UR: YELLOW
EXPIRATION DATE: ABNORMAL
GLUCOSE UR STRIP-MCNC: NEGATIVE MG/DL
KETONES UR QL: ABNORMAL
LEUKOCYTE EST, POC: ABNORMAL
Lab: ABNORMAL
NITRITE UR-MCNC: NEGATIVE MG/ML
PH UR: 7 [PH] (ref 5–8)
PROT UR STRIP-MCNC: NEGATIVE MG/DL
RBC # UR STRIP: NEGATIVE /UL
SP GR UR: 1.02 (ref 1–1.03)
UROBILINOGEN UR QL: ABNORMAL

## 2025-06-27 RX ORDER — ELECTROLYTES/DEXTROSE
SOLUTION, ORAL ORAL
COMMUNITY

## 2025-06-27 RX ORDER — SODIUM CHLORIDE 0.9 % (FLUSH) 0.9 %
10 SYRINGE (ML) INJECTION AS NEEDED
OUTPATIENT
Start: 2025-06-27

## 2025-06-27 RX ORDER — SACCHAROMYCES BOULARDII 250 MG
1 CAPSULE ORAL EVERY 12 HOURS SCHEDULED
COMMUNITY

## 2025-06-27 RX ORDER — SODIUM CHLORIDE 9 MG/ML
40 INJECTION, SOLUTION INTRAVENOUS AS NEEDED
OUTPATIENT
Start: 2025-06-27

## 2025-06-27 RX ORDER — SODIUM CHLORIDE 0.9 % (FLUSH) 0.9 %
10 SYRINGE (ML) INJECTION EVERY 12 HOURS SCHEDULED
OUTPATIENT
Start: 2025-06-27

## 2025-06-27 RX ORDER — SODIUM FLUORIDE 6.1 MG/ML
GEL, DENTIFRICE DENTAL
COMMUNITY
Start: 2025-05-12

## 2025-07-06 ENCOUNTER — APPOINTMENT (OUTPATIENT)
Dept: CT IMAGING | Facility: HOSPITAL | Age: 65
End: 2025-07-06
Payer: COMMERCIAL

## 2025-07-06 ENCOUNTER — HOSPITAL ENCOUNTER (EMERGENCY)
Facility: HOSPITAL | Age: 65
Discharge: HOME | End: 2025-07-06
Attending: EMERGENCY MEDICINE | Admitting: EMERGENCY MEDICINE
Payer: COMMERCIAL

## 2025-07-06 VITALS
HEIGHT: 63 IN | SYSTOLIC BLOOD PRESSURE: 128 MMHG | OXYGEN SATURATION: 96 % | HEART RATE: 75 BPM | TEMPERATURE: 97.8 F | BODY MASS INDEX: 28.79 KG/M2 | DIASTOLIC BLOOD PRESSURE: 65 MMHG | RESPIRATION RATE: 14 BRPM | WEIGHT: 162.48 LBS

## 2025-07-06 DIAGNOSIS — K52.9 GASTROENTERITIS: Primary | ICD-10-CM

## 2025-07-06 LAB
ALBUMIN SERPL-MCNC: 4.6 G/DL (ref 3.5–5.2)
ALBUMIN/GLOB SERPL: 1.4 G/DL
ALP SERPL-CCNC: 144 U/L (ref 39–117)
ALT SERPL W P-5'-P-CCNC: 11 U/L (ref 1–33)
ANION GAP SERPL CALCULATED.3IONS-SCNC: 11.4 MMOL/L (ref 5–15)
AST SERPL-CCNC: 23 U/L (ref 1–32)
BACTERIA UR QL AUTO: NORMAL /HPF
BASOPHILS # BLD AUTO: 0.02 10*3/MM3 (ref 0–0.2)
BASOPHILS NFR BLD AUTO: 0.2 % (ref 0–1.5)
BILIRUB SERPL-MCNC: 0.9 MG/DL (ref 0–1.2)
BILIRUB UR QL STRIP: ABNORMAL
BUN SERPL-MCNC: 14.5 MG/DL (ref 8–23)
BUN/CREAT SERPL: 19.1 (ref 7–25)
CALCIUM SPEC-SCNC: 10.1 MG/DL (ref 8.6–10.5)
CHLORIDE SERPL-SCNC: 103 MMOL/L (ref 98–107)
CLARITY UR: CLEAR
CO2 SERPL-SCNC: 25.6 MMOL/L (ref 22–29)
COLOR UR: YELLOW
CREAT SERPL-MCNC: 0.76 MG/DL (ref 0.57–1)
D-LACTATE SERPL-SCNC: 1.6 MMOL/L (ref 0.5–2)
DEPRECATED RDW RBC AUTO: 41.3 FL (ref 37–54)
EGFRCR SERPLBLD CKD-EPI 2021: 88.2 ML/MIN/1.73
EOSINOPHIL # BLD AUTO: 0.44 10*3/MM3 (ref 0–0.4)
EOSINOPHIL NFR BLD AUTO: 4.3 % (ref 0.3–6.2)
ERYTHROCYTE [DISTWIDTH] IN BLOOD BY AUTOMATED COUNT: 12.9 % (ref 12.3–15.4)
GLOBULIN UR ELPH-MCNC: 3.2 GM/DL
GLUCOSE SERPL-MCNC: 92 MG/DL (ref 65–99)
GLUCOSE UR STRIP-MCNC: NEGATIVE MG/DL
HCT VFR BLD AUTO: 47.3 % (ref 34–46.6)
HGB BLD-MCNC: 16.6 G/DL (ref 12–15.9)
HGB UR QL STRIP.AUTO: NEGATIVE
HOLD SPECIMEN: NORMAL
HOLD SPECIMEN: NORMAL
HYALINE CASTS UR QL AUTO: NORMAL /LPF
IMM GRANULOCYTES # BLD AUTO: 0.03 10*3/MM3 (ref 0–0.05)
IMM GRANULOCYTES NFR BLD AUTO: 0.3 % (ref 0–0.5)
KETONES UR QL STRIP: ABNORMAL
LEUKOCYTE ESTERASE UR QL STRIP.AUTO: ABNORMAL
LIPASE SERPL-CCNC: 18 U/L (ref 13–60)
LYMPHOCYTES # BLD AUTO: 2.91 10*3/MM3 (ref 0.7–3.1)
LYMPHOCYTES NFR BLD AUTO: 28.7 % (ref 19.6–45.3)
MCH RBC QN AUTO: 30.7 PG (ref 26.6–33)
MCHC RBC AUTO-ENTMCNC: 35.1 G/DL (ref 31.5–35.7)
MCV RBC AUTO: 87.4 FL (ref 79–97)
MONOCYTES # BLD AUTO: 0.73 10*3/MM3 (ref 0.1–0.9)
MONOCYTES NFR BLD AUTO: 7.2 % (ref 5–12)
NEUTROPHILS NFR BLD AUTO: 59.3 % (ref 42.7–76)
NEUTROPHILS NFR BLD AUTO: 6.02 10*3/MM3 (ref 1.7–7)
NITRITE UR QL STRIP: NEGATIVE
NRBC BLD AUTO-RTO: 0 /100 WBC (ref 0–0.2)
PH UR STRIP.AUTO: 7.5 [PH] (ref 5–8)
PLATELET # BLD AUTO: 219 10*3/MM3 (ref 140–450)
PMV BLD AUTO: 10.9 FL (ref 6–12)
POTASSIUM SERPL-SCNC: 3.6 MMOL/L (ref 3.5–5.2)
PROT SERPL-MCNC: 7.8 G/DL (ref 6–8.5)
PROT UR QL STRIP: NEGATIVE
RBC # BLD AUTO: 5.41 10*6/MM3 (ref 3.77–5.28)
RBC # UR STRIP: NORMAL /HPF
REF LAB TEST METHOD: NORMAL
SODIUM SERPL-SCNC: 140 MMOL/L (ref 136–145)
SP GR UR STRIP: 1.01 (ref 1–1.03)
SQUAMOUS #/AREA URNS HPF: NORMAL /HPF
UROBILINOGEN UR QL STRIP: ABNORMAL
WBC # UR STRIP: NORMAL /HPF
WBC NRBC COR # BLD AUTO: 10.15 10*3/MM3 (ref 3.4–10.8)
WHOLE BLOOD HOLD COAG: NORMAL
WHOLE BLOOD HOLD SPECIMEN: NORMAL

## 2025-07-06 PROCEDURE — 85025 COMPLETE CBC W/AUTO DIFF WBC: CPT

## 2025-07-06 PROCEDURE — 25010000002 HYDROMORPHONE 1 MG/ML SOLUTION: Performed by: EMERGENCY MEDICINE

## 2025-07-06 PROCEDURE — 96374 THER/PROPH/DIAG INJ IV PUSH: CPT

## 2025-07-06 PROCEDURE — 25010000002 FAMOTIDINE 10 MG/ML SOLUTION: Performed by: EMERGENCY MEDICINE

## 2025-07-06 PROCEDURE — 74177 CT ABD & PELVIS W/CONTRAST: CPT

## 2025-07-06 PROCEDURE — 83690 ASSAY OF LIPASE: CPT

## 2025-07-06 PROCEDURE — 81001 URINALYSIS AUTO W/SCOPE: CPT

## 2025-07-06 PROCEDURE — 25810000003 SODIUM CHLORIDE 0.9 % SOLUTION: Performed by: EMERGENCY MEDICINE

## 2025-07-06 PROCEDURE — 96375 TX/PRO/DX INJ NEW DRUG ADDON: CPT

## 2025-07-06 PROCEDURE — 99285 EMERGENCY DEPT VISIT HI MDM: CPT

## 2025-07-06 PROCEDURE — 25510000001 IOPAMIDOL PER 1 ML: Performed by: EMERGENCY MEDICINE

## 2025-07-06 PROCEDURE — 83605 ASSAY OF LACTIC ACID: CPT

## 2025-07-06 PROCEDURE — 25010000002 ONDANSETRON PER 1 MG: Performed by: EMERGENCY MEDICINE

## 2025-07-06 PROCEDURE — 80053 COMPREHEN METABOLIC PANEL: CPT

## 2025-07-06 RX ORDER — ONDANSETRON 4 MG/1
4 TABLET, ORALLY DISINTEGRATING ORAL EVERY 6 HOURS PRN
Qty: 15 TABLET | Refills: 0 | Status: SHIPPED | OUTPATIENT
Start: 2025-07-06

## 2025-07-06 RX ORDER — DICYCLOMINE HCL 20 MG
20 TABLET ORAL EVERY 6 HOURS PRN
Qty: 12 TABLET | Refills: 0 | Status: SHIPPED | OUTPATIENT
Start: 2025-07-06

## 2025-07-06 RX ORDER — ONDANSETRON 2 MG/ML
4 INJECTION INTRAMUSCULAR; INTRAVENOUS ONCE
Status: COMPLETED | OUTPATIENT
Start: 2025-07-06 | End: 2025-07-06

## 2025-07-06 RX ORDER — SODIUM CHLORIDE 0.9 % (FLUSH) 0.9 %
10 SYRINGE (ML) INJECTION AS NEEDED
Status: DISCONTINUED | OUTPATIENT
Start: 2025-07-06 | End: 2025-07-06 | Stop reason: HOSPADM

## 2025-07-06 RX ORDER — PROMETHAZINE HYDROCHLORIDE 25 MG/1
25 SUPPOSITORY RECTAL EVERY 6 HOURS PRN
Qty: 12 SUPPOSITORY | Refills: 0 | Status: SHIPPED | OUTPATIENT
Start: 2025-07-06

## 2025-07-06 RX ORDER — FAMOTIDINE 10 MG/ML
20 INJECTION, SOLUTION INTRAVENOUS ONCE
Status: COMPLETED | OUTPATIENT
Start: 2025-07-06 | End: 2025-07-06

## 2025-07-06 RX ORDER — IOPAMIDOL 755 MG/ML
100 INJECTION, SOLUTION INTRAVASCULAR
Status: COMPLETED | OUTPATIENT
Start: 2025-07-06 | End: 2025-07-06

## 2025-07-06 RX ADMIN — IOPAMIDOL 100 ML: 755 INJECTION, SOLUTION INTRAVENOUS at 12:15

## 2025-07-06 RX ADMIN — SODIUM CHLORIDE 1000 ML: 9 INJECTION, SOLUTION INTRAVENOUS at 11:25

## 2025-07-06 RX ADMIN — HYDROMORPHONE HYDROCHLORIDE 0.5 MG: 1 INJECTION, SOLUTION INTRAMUSCULAR; INTRAVENOUS; SUBCUTANEOUS at 13:04

## 2025-07-06 RX ADMIN — ONDANSETRON 4 MG: 2 INJECTION, SOLUTION INTRAMUSCULAR; INTRAVENOUS at 13:04

## 2025-07-06 RX ADMIN — FAMOTIDINE 20 MG: 10 INJECTION INTRAVENOUS at 11:25

## 2025-07-06 NOTE — ED PROVIDER NOTES
Time: 10:43 AM EDT  Date of encounter:  7/6/2025  Independent Historian/Clinical History and Information was obtained by:   Patient    History is limited by: N/A    Chief Complaint: Abdominal pain, nausea vomiting and diarrhea      History of Present Illness:  Patient is a 65 y.o. year old female who presents to the emergency department for evaluation of abdominal pain, nausea, vomiting and diarrhea.  Patient states she was here a month ago for the exact same thing.  This episode started 2 to 3 days ago.  She describes initially having increasing acid reflux 3 days ago.  She is now had 2 days of intermittent diarrhea, much worse today.  Has had episodes of vomiting since 3 AM today.  No reported fever.  Describes 8 out of 10 epigastric burning pain.      Patient Care Team  Primary Care Provider: Carlee Menchaca PA-C    Past Medical History:     Allergies   Allergen Reactions    Sulfa Antibiotics Unknown - High Severity     Past Medical History:   Diagnosis Date    Arthritis     oseto and rheumatoid    Disease of thyroid gland     GERD (gastroesophageal reflux disease)     Not sure    Hypertension     At least 15 years ago    PONV (postoperative nausea and vomiting)      Past Surgical History:   Procedure Laterality Date    CHOLECYSTECTOMY  1992    COLONOSCOPY N/A 3/20/2023    Procedure: COLONOSCOPY with polypectomy with hot/cold snares with resolution clip x's 1;  Surgeon: Idris Conner MD;  Location: [facility] ENDOSCOPY;  Service: Gastroenterology;  Laterality: N/A;  colon polyps, diverticulosis    ENDOMETRIAL ABLATION N/A     TONSILLECTOMY      TUBAL ABDOMINAL LIGATION  2005     Family History   Problem Relation Age of Onset    Colon cancer Neg Hx     Malig Hyperthermia Neg Hx        Home Medications:  Prior to Admission medications    Medication Sig Start Date End Date Taking? Authorizing Provider   bisoprolol-hydrochlorothiazide (ZIAC) 10-6.25 MG per tablet Take 1 tablet by mouth 2 (Two) Times a Day.     Flora Mckinney MD   Calcium Citrate-Vitamin D (Calcium Citrate + D3) 200-6.25 MG-MCG tablet Every Night.    Flora Mckinney MD   CIMETIDINE 200 PO Take 200 mg by mouth As Needed.    Flora Mckinney MD   Cranberry-Vitamin C (CRANBERRY CONCENTRATE/VITAMINC PO) Daily.    Flora Mckinney MD   escitalopram (LEXAPRO) 10 MG tablet Take 1 tablet by mouth Every Night. 1/2/23   Flora Mckinney MD   folic acid (FOLVITE) 1 MG tablet Take 1 tablet by mouth Daily.    Flora Mckinney MD   Krill Oil 500 MG capsule Take 1 capsule by mouth Daily.    Flora Mckinney MD   levothyroxine (SYNTHROID, LEVOTHROID) 125 MCG tablet Take 88 mcg by mouth Daily. 12/6/22   Flora Mckinney MD   liothyronine (CYTOMEL) 5 MCG tablet Take 2.5 tablets by mouth Daily.    Flora Mckinney MD   losartan (COZAAR) 100 MG tablet Take 1 tablet by mouth Daily.    Flora Mckinney MD   Magnesium Citrate 100 MG tablet Take 100 mg by mouth Every Night.    Flora Mckinney MD   multivitamin with minerals (Multivitamin Adult) tablet tablet Take  by mouth.    Flora Mckinney MD   Potassium 99 MG tablet Take 99 mg by mouth Every Night.    Flora Mckinney MD   PreviDent 5000 Dry Mouth 1.1 % gel APPLY A THIN RIBBON ON TOOTHBRUSH AND BRUSH ON TEETH FOR 3 MINUTES AND SPIT OUT 5/12/25   Flora Mckinney MD   saccharomyces boulardii (FLORASTOR) 250 MG capsule Take 1 capsule by mouth Every 12 (Twelve) Hours.    Flora Mckinney MD   Tirzepatide (Mounjaro) 7.5 MG/0.5ML solution auto-injector Inject 7.5 mg under the skin into the appropriate area as directed. Every 8-10 days    Flora Mckinney MD   Turmeric Curcumin capsule Take  by mouth.    Flora Mckinney MD        Social History:   Social History     Tobacco Use    Smoking status: Never     Passive exposure: Past    Smokeless tobacco: Never   Vaping Use    Vaping status: Never Used   Substance Use Topics    Alcohol use: Never  "    Comment: social    Drug use: Never         Review of Systems:  Review of Systems   Constitutional:  Negative for chills and fever.   HENT:  Negative for congestion, rhinorrhea and sore throat.    Eyes:  Negative for photophobia.   Respiratory:  Negative for apnea, cough, chest tightness and shortness of breath.    Cardiovascular:  Negative for chest pain and palpitations.   Gastrointestinal:  Positive for abdominal pain, diarrhea, nausea and vomiting.   Endocrine: Negative.    Genitourinary:  Negative for difficulty urinating and dysuria.   Musculoskeletal:  Negative for back pain, joint swelling and myalgias.   Skin:  Negative for color change and wound.   Allergic/Immunologic: Negative.    Neurological:  Negative for seizures and headaches.   Psychiatric/Behavioral: Negative.     All other systems reviewed and are negative.       Physical Exam:  /65   Pulse 75   Temp 97.8 °F (36.6 °C) (Oral)   Resp 14   Ht 160 cm (63\")   Wt 73.7 kg (162 lb 7.7 oz)   SpO2 96%   BMI 28.78 kg/m²     Physical Exam  Vitals and nursing note reviewed.   Constitutional:       General: She is awake.      Appearance: Normal appearance.   HENT:      Head: Normocephalic and atraumatic.      Nose: Nose normal.      Mouth/Throat:      Mouth: Mucous membranes are moist.   Eyes:      Extraocular Movements: Extraocular movements intact.      Pupils: Pupils are equal, round, and reactive to light.   Cardiovascular:      Rate and Rhythm: Normal rate and regular rhythm.      Heart sounds: Normal heart sounds.   Pulmonary:      Effort: Pulmonary effort is normal. No respiratory distress.      Breath sounds: Normal breath sounds. No wheezing, rhonchi or rales.   Abdominal:      General: Bowel sounds are normal.      Palpations: Abdomen is soft.      Tenderness: There is abdominal tenderness. There is no guarding or rebound.      Comments: No rigidity   Musculoskeletal:         General: No tenderness. Normal range of motion.      " Cervical back: Normal range of motion and neck supple.   Skin:     General: Skin is warm and dry.      Coloration: Skin is not jaundiced.   Neurological:      General: No focal deficit present.      Mental Status: She is alert. Mental status is at baseline.   Psychiatric:         Mood and Affect: Mood normal.                    Medical Decision Making:      Comorbidities that affect care:    Hypertension, GERD, thyroid disease    External Notes reviewed:    Previous Clinic Note: Urology office visit 6/27/2025.  Description: Kidney stone      The following orders were placed and all results were independently analyzed by me:  Orders Placed This Encounter   Procedures    Enteric Bacterial Panel - Stool, Per Rectum    CT Abdomen Pelvis With Contrast    [place] Draw    Comprehensive Metabolic Panel    Lipase    Urinalysis With Microscopic If Indicated (No Culture) - Urine, Clean Catch    Lactic Acid, Plasma    CBC Auto Differential    Urinalysis, Microscopic Only - Urine, Clean Catch    Ambulatory Referral to Gastroenterology    NPO Diet NPO Type: Strict NPO    Undress & Gown    Insert Peripheral IV    CBC & Differential    Green Top (Gel)    Lavender Top    Gold Top - SST    Light Blue Top       Medications Given in the Emergency Department:  Medications   sodium chloride 0.9 % flush 10 mL (has no administration in time range)   sodium chloride 0.9 % bolus 1,000 mL (0 mL Intravenous Stopped 7/6/25 1507)   famotidine (PEPCID) injection 20 mg (20 mg Intravenous Given 7/6/25 1125)   iopamidol (ISOVUE-370) 76 % injection 100 mL (100 mL Intravenous Given 7/6/25 1215)   HYDROmorphone (DILAUDID) injection 0.5 mg (0.5 mg Intravenous Given 7/6/25 1304)   ondansetron (ZOFRAN) injection 4 mg (4 mg Intravenous Given 7/6/25 1304)        ED Course:         Labs:    Lab Results (last 24 hours)       Procedure Component Value Units Date/Time    CBC & Differential [[id number]]  (Abnormal) Collected: 07/06/25 0926    Specimen: Blood  Updated: 07/06/25 0940    Narrative:      The following orders were created for panel order CBC & Differential.  Procedure                               Abnormality         Status                     ---------                               -----------         ------                     CBC Auto Differential[[id number]]        Abnormal            Final result                 Please view results for these tests on the individual orders.    Comprehensive Metabolic Panel [[id number]]  (Abnormal) Collected: 07/06/25 0926    Specimen: Blood Updated: 07/06/25 0959     Glucose 92 mg/dL      BUN 14.5 mg/dL      Creatinine 0.76 mg/dL      Sodium 140 mmol/L      Potassium 3.6 mmol/L      Chloride 103 mmol/L      CO2 25.6 mmol/L      Calcium 10.1 mg/dL      Total Protein 7.8 g/dL      Albumin 4.6 g/dL      ALT (SGPT) 11 U/L      AST (SGOT) 23 U/L      Alkaline Phosphatase 144 U/L      Total Bilirubin 0.9 mg/dL      Globulin 3.2 gm/dL      A/G Ratio 1.4 g/dL      BUN/Creatinine Ratio 19.1     Anion Gap 11.4 mmol/L      eGFR 88.2 mL/min/1.73     Narrative:      GFR Categories in Chronic Kidney Disease (CKD)              GFR Category          GFR (mL/min/1.73)    Interpretation  G1                    90 or greater        Normal or high (1)  G2                    60-89                Mild decrease (1)  G3a                   45-59                Mild to moderate decrease  G3b                   30-44                Moderate to severe decrease  G4                    15-29                Severe decrease  G5                    14 or less           Kidney failure    (1)In the absence of evidence of kidney disease, neither GFR category G1 or G2 fulfill the criteria for CKD.    eGFR calculation 2021 CKD-EPI creatinine equation, which does not include race as a factor    Lipase [[id number]]  (Normal) Collected: 07/06/25 0926    Specimen: Blood Updated: 07/06/25 0959     Lipase 18 U/L     Lactic Acid, Plasma [[id number]]  (Normal) Collected:  07/06/25 0926    Specimen: Blood Updated: 07/06/25 0955     Lactate 1.6 mmol/L     CBC Auto Differential [[id number]]  (Abnormal) Collected: 07/06/25 0926    Specimen: Blood Updated: 07/06/25 0940     WBC 10.15 10*3/mm3      RBC 5.41 10*6/mm3      Hemoglobin 16.6 g/dL      Hematocrit 47.3 %      MCV 87.4 fL      MCH 30.7 pg      MCHC 35.1 g/dL      RDW 12.9 %      RDW-SD 41.3 fl      MPV 10.9 fL      Platelets 219 10*3/mm3      Neutrophil % 59.3 %      Lymphocyte % 28.7 %      Monocyte % 7.2 %      Eosinophil % 4.3 %      Basophil % 0.2 %      Immature Grans % 0.3 %      Neutrophils, Absolute 6.02 10*3/mm3      Lymphocytes, Absolute 2.91 10*3/mm3      Monocytes, Absolute 0.73 10*3/mm3      Eosinophils, Absolute 0.44 10*3/mm3      Basophils, Absolute 0.02 10*3/mm3      Immature Grans, Absolute 0.03 10*3/mm3      nRBC 0.0 /100 WBC     Urinalysis With Microscopic If Indicated (No Culture) - Urine, Clean Catch [[id number]]  (Abnormal) Collected: 07/06/25 0927    Specimen: Urine, Clean Catch Updated: 07/06/25 0948     Color, UA Yellow     Appearance, UA Clear     pH, UA 7.5     Specific Gravity, UA 1.010     Glucose, UA Negative     Ketones, UA 15 mg/dL (1+)     Bilirubin, UA Small (1+)     Blood, UA Negative     Protein, UA Negative     Leuk Esterase, UA Trace     Nitrite, UA Negative     Urobilinogen, UA 1.0 E.U./dL    Urinalysis, Microscopic Only - Urine, Clean Catch [[id number]] Collected: 07/06/25 0927    Specimen: Urine, Clean Catch Updated: 07/06/25 0948     RBC, UA 0-2 /HPF      WBC, UA 0-2 /HPF      Bacteria, UA None Seen /HPF      Squamous Epithelial Cells, UA 0-2 /HPF      Hyaline Casts, UA 0-2 /LPF      Methodology Automated Microscopy             Imaging:    CT Abdomen Pelvis With Contrast  Result Date: 7/6/2025  CT ABDOMEN PELVIS W CONTRAST Date of Exam: 7/6/2025 12:02 PM EDT Indication: 8 out of 10 upper abdominal pain abdominal pain. Comparison: June 7, 2025 Technique: Axial CT images were obtained of the  abdomen and pelvis after the uneventful intravenous administration of iodinated contrast. Reconstructed coronal and sagittal images were also obtained. Automated exposure control and iterative construction methods were used. Lower Thorax: Lung bases are clear. Peritoneum: No free air or free fluid. Appendix: Appendix is well seen and is normal. Kidneys: There are multiple nonobstructing renal calculi in the left measuring up to 8 mm in size. Ureters: No obstructing calculi or mass. Urinary bladder: Urinary bladder has normal appearance on CT given degree of distention. Liver: No focal hepatic lesions. Normal size liver. Gallbladder and bile ducts: Gallbladder absent Spleen: Spleen is normal size.  No focal splenic lesions. Adrenal glands: 9 mm left adrenal nodule is stable Pancreas: No focal masses.  No pancreatic duct dilation. No surrounding inflammation. Abdominal aorta and Vascular Structures: No aneurysmal dilation. No significant atherosclerotic disease. Stomach and Bowel: No abnormally dilated loops of bowel.  No significant hiatal hernia.  No significant bowel wall thickening.  Ligament of Treitz has normal anatomic position. Numerous diverticula are evident in the descending and sigmoid colon. Reproductive Organs: There are multiple uterine fibroids Largest fibroid is densely calcified measuring 4.8 x 4.4 cm in size. These appear stable Lymph nodes: No pathologically enlarged lymph nodes. Soft tissues: Unremarkable. Osseous structures: Mild degenerative changes in the spine     No acute abdominal process. Stable left renal calculi nonobstructive Electronically Signed: Eric Saucedo MD  7/6/2025 12:39 PM EDT  Workstation ID: [id number]        Differential Diagnosis and Discussion:    Abdominal Pain: Based on the patient's signs and symptoms, I considered abdominal aortic aneurysm, small bowel obstruction, pancreatitis, acute cholecystitis, acute appendecitis, peptic ulcer disease, gastritis, colitis,  endocrine disorders, irritable bowel syndrome and other differential diagnosis an etiology of the patient's abdominal pain.  Diarrhea: Differential diagnosis includes but is not limited to malabsorption syndrome, bacterial infection, carcinoid syndrome, pancreatic hypersecretion, viral infection, celiac sprue, Crohn's disease, ulcerative colitis, ischemic colitis, colitis, hypermotility, and irritable bowel syndrome.  Vomiting: Differential diagnosis includes but is not limited to migraine, labyrinthine disorders, psychogenic, metabolic and endocrine causes, peptic ulcer, gastric outlet obstruction, gastritis, gastroenteritis, appendicitis, intestinal obstruction, paralytic ileus, food poisoning, cholecystitis, acute hepatitis, acute pancreatitis, acute febrile illness, and myocardial infarction.    PROCEDURES:    Labs were collected in the emergency department and all labs were reviewed and interpreted by me.  CT scan was performed in the emergency department and the CT scan radiology impression was interpreted by me.    No orders to display       Procedures    MDM     Amount and/or Complexity of Data Reviewed  Decide to obtain previous medical records or to obtain history from someone other than the patient: yes         There is no evidence of abdominal emergency such as appendicitis, cholecystitis or bowel obstuction. There is no clinical concern for ovarian torsion. Lab workup and vital signs are reassuring. The patient shows no signs of pyelonephritis or sepsis.    White blood cell count is normal.  Renal function including BUN and creatinine are normal.  Electrolytes are normal including liver function test.  Lactic acid is normal.  Urinalysis shows no urinary tract infection.              Patient Care Considerations:    SEPSIS was considered but is NOT present in the emergency department as SIRS criteria is not present.      Consultants/Shared Management Plan:    None    Social Determinants of  Health:    Patient is independent, reliable, and has access to care.       Disposition and Care Coordination:    Discharged: I considered escalation of care by admitting this patient to the hospital, however CT and lab work are very reassuring.        Final diagnoses:   Gastroenteritis        ED Disposition       ED Disposition   Discharge    Condition   Stable    Comment   --               This medical record created using voice recognition software.             Elio Gil MD  07/06/25 8490

## 2025-07-11 ENCOUNTER — CLINICAL SUPPORT (OUTPATIENT)
Dept: GASTROENTEROLOGY | Facility: CLINIC | Age: 65
End: 2025-07-11
Payer: COMMERCIAL

## 2025-07-11 ENCOUNTER — PREP FOR SURGERY (OUTPATIENT)
Dept: OTHER | Facility: HOSPITAL | Age: 65
End: 2025-07-11
Payer: COMMERCIAL

## 2025-07-11 ENCOUNTER — TELEPHONE (OUTPATIENT)
Dept: GASTROENTEROLOGY | Facility: CLINIC | Age: 65
End: 2025-07-11
Payer: COMMERCIAL

## 2025-07-11 DIAGNOSIS — R11.10 VOMITING, UNSPECIFIED VOMITING TYPE, UNSPECIFIED WHETHER NAUSEA PRESENT: ICD-10-CM

## 2025-07-11 DIAGNOSIS — R11.0 NAUSEA: Primary | ICD-10-CM

## 2025-07-11 NOTE — TELEPHONE ENCOUNTER
Per Dr Conner: can we call this patient and schedule for egd in 2 wks. recent er visit. nausea vomiting       Spoke with pt.  EGD scheduled for 7.24.25, 12:00 noon arrival time. yadira   36.6

## 2025-07-11 NOTE — PROGRESS NOTES
Vira Yates  1960  65 y.o.    Reason for call: EGD for N/V per Dr Conner, Recent ER visit  If recall, please list diagnosis:   Prep prescribed: N/A  Prep instructions reviewed with patient and sent to patient via [email or web address]  Is the patient currently on any injectable or oral medications for weight loss or diabetes? Yes  Clearance needed? No  If yes, what clearance is needed? N/A  Clearance has been requested from   The patient has been scheduled for: EGD    Family history of colon cancer? No  If yes, indicate relative:   Tentative Procedure Date: 7.24.25    Date/Place of last Scope: none  Able to obtain report?   Family History   Problem Relation Age of Onset    Liver cancer Brother     Cancer Paternal Aunt     Colon cancer Neg Hx     Malig Hyperthermia Neg Hx     Esophageal cancer Neg Hx     Rectal cancer Neg Hx     Stomach cancer Neg Hx      Past Medical History:   Diagnosis Date    Arthritis     oseto and rheumatoid    Disease of thyroid gland     Fibroid     GERD (gastroesophageal reflux disease)     Not sure    Hypertension     At least 15 years ago    PONV (postoperative nausea and vomiting)     Urinary tract infection      Allergies   Allergen Reactions    Sulfa Antibiotics Unknown - High Severity     Past Surgical History:   Procedure Laterality Date    CHOLECYSTECTOMY  1992    COLONOSCOPY N/A 3/20/2023    Procedure: COLONOSCOPY with polypectomy with hot/cold snares with resolution clip x's 1;  Surgeon: Idris Conner MD;  Location: [facility] ENDOSCOPY;  Service: Gastroenterology;  Laterality: N/A;  colon polyps, diverticulosis    ENDOMETRIAL ABLATION N/A     TONSILLECTOMY      TUBAL ABDOMINAL LIGATION  2005     Social History     Socioeconomic History    Marital status:    Tobacco Use    Smoking status: Never     Passive exposure: Past    Smokeless tobacco: Never   Vaping Use    Vaping status: Never Used   Substance and Sexual Activity    Alcohol use: Never     Comment: social     Drug use: Never    Sexual activity: Defer     Partners: Male     Birth control/protection: Pill       Current Outpatient Medications:     bisoprolol-hydrochlorothiazide (ZIAC) 10-6.25 MG per tablet, Take 1 tablet by mouth 2 (Two) Times a Day., Disp: , Rfl:     Calcium Citrate-Vitamin D (Calcium Citrate + D3) 200-6.25 MG-MCG tablet, Every Night., Disp: , Rfl:     CIMETIDINE 200 PO, Take 200 mg by mouth As Needed., Disp: , Rfl:     Cranberry-Vitamin C (CRANBERRY CONCENTRATE/VITAMINC PO), Daily., Disp: , Rfl:     dicyclomine (BENTYL) 20 MG tablet, Take 1 tablet by mouth Every 6 (Six) Hours As Needed for Abdominal Cramping., Disp: 12 tablet, Rfl: 0    escitalopram (LEXAPRO) 10 MG tablet, Take 1 tablet by mouth Every Night., Disp: , Rfl:     folic acid (FOLVITE) 1 MG tablet, Take 1 tablet by mouth Daily., Disp: , Rfl:     Krill Oil 500 MG capsule, Take 1 capsule by mouth Daily., Disp: , Rfl:     levothyroxine (SYNTHROID, LEVOTHROID) 125 MCG tablet, Take 88 mcg by mouth Daily., Disp: , Rfl:     liothyronine (CYTOMEL) 5 MCG tablet, Take 2.5 tablets by mouth Daily., Disp: , Rfl:     losartan (COZAAR) 100 MG tablet, Take 1 tablet by mouth Daily., Disp: , Rfl:     Magnesium Citrate 100 MG tablet, Take 100 mg by mouth Every Night., Disp: , Rfl:     multivitamin with minerals (Multivitamin Adult) tablet tablet, Take  by mouth., Disp: , Rfl:     NALTREXONE HCL, PAIN, PO, APPLY FOUR CLICKS (ONE GR) EVERY NIGHT AT BEDTIME, Disp: , Rfl:     ondansetron ODT (ZOFRAN-ODT) 4 MG disintegrating tablet, Place 1 tablet on the tongue Every 6 (Six) Hours As Needed for Nausea or Vomiting., Disp: 15 tablet, Rfl: 0    Potassium 99 MG tablet, Take 99 mg by mouth Every Night., Disp: , Rfl:     PreviDent 5000 Dry Mouth 1.1 % gel, APPLY A THIN RIBBON ON TOOTHBRUSH AND BRUSH ON TEETH FOR 3 MINUTES AND SPIT OUT, Disp: , Rfl:     promethazine (PHENERGAN) 25 MG suppository, Insert 1 suppository into the rectum Every 6 (Six) Hours As Needed for Nausea  or Vomiting., Disp: 12 suppository, Rfl: 0    saccharomyces boulardii (FLORASTOR) 250 MG capsule, Take 1 capsule by mouth Every 12 (Twelve) Hours., Disp: , Rfl:     Tirzepatide (Mounjaro) 7.5 MG/0.5ML solution auto-injector, Inject 7.5 mg under the skin into the appropriate area as directed. Every 8-10 days, Disp: , Rfl:     Turmeric Curcumin capsule, Take  by mouth., Disp: , Rfl:

## 2025-07-11 NOTE — PAT
Arrival time of 1200 given.    Come to [facility] entrance, entrance C. Bring picture ID and insurance care. Have licensed  for transportation home after the procedure.    Nothing to eat or drink after midnight.     Hold all medications the morning of the procedure except nebulizers or inhalers.     Bring mediations and inhalers to hospital with you.     Plan to be here 3-4 hours. Do not bring valuables with you to hospital.     Pt verbalized understanding of instructions.

## 2025-07-14 ENCOUNTER — TELEPHONE (OUTPATIENT)
Dept: GASTROENTEROLOGY | Facility: CLINIC | Age: 65
End: 2025-07-14
Payer: COMMERCIAL

## 2025-07-14 ENCOUNTER — TELEPHONE (OUTPATIENT)
Dept: UROLOGY | Age: 65
End: 2025-07-14

## 2025-07-14 NOTE — TELEPHONE ENCOUNTER
Returned call to patient-advised Endo will give her a note stating she is not to return to work for 24 hrs following anesthesia. We do not do the letters ahead of the scope.   Patient voiced understanding

## 2025-07-14 NOTE — TELEPHONE ENCOUNTER
Hub staff attempted to follow warm transfer process and was unsuccessful     Caller: Vira Yates    Relationship to patient: Self    Best call back number:  [phone]    Patient is needing: PT IS SCHEDULED FOR A COLONOSCOPY ON 07/24/2025. PT IS NEEDING A WORK EXCUSE FOR 07/24 AND 07/25. PT WOULD LIKE TO  IF POSSIBLE IN THE NEXT 24/48HRS. PLEASE CALL AND ADVISE.

## 2025-07-17 RX ORDER — TIRZEPATIDE 5 MG/.5ML
INJECTION, SOLUTION SUBCUTANEOUS
COMMUNITY
Start: 2025-06-09

## 2025-07-17 NOTE — PRE-PROCEDURE INSTRUCTIONS
PATIENT INSTRUCTED TO BE:    - NOTHING TO EAT AFTER MIDNIGHT OR CHEW, EXCEPT CAN HAVE CLEAR LIQUIDS 2 HOURS PRIOR TO SURGERY ARRIVAL TIME , NO MORE THAN 8 OZ. (NOTHING RED)     - TO HOLD ALL VITAMINS, SUPPLEMENTS, NSAIDS FOR ONE WEEK PRIOR TO THEIR SURGICAL PROCEDURE    - DO NOT TAKE _______Mounjaro_______________ 7 DAYS PRIOR TO PROCEDURE PER ANESTHESIA RECOMMENDATIONS/INSTRUCTIONS     - BATHING INSTRUCTIONS GIVEN    INSTRUCTED NO LOTIONS, JEWELRY, PIERCINGS,  NAIL POLISH, OR DEODORANT DAY OF SURGERY        -INSTRUCTED TO TAKE THE FOLLOWING MEDICATIONS THE DAY OF SURGERY WITH SIPS OF WATER:   Probiotic, Ziac, Levothyroxine, Cytomel        - DO NOT BRING ANY MEDICATIONS WITH YOU TO THE HOSPITAL THE DAY OF SURGERY, EXCEPT IF USE INHALERS. BRING INHALERS DAY OF SURGERY       - BRING CPAP OR BIPAP TO THE HOSPITAL ONLY IF YOU ARE SPENDING THE NIGHT    - DO NOT SMOKE OR VAPE 24 HOURS PRIOR TO PROCEDURE PER ANESTHESIA REQUEST     -MAKE SURE YOU HAVE A RIDE HOME OR SOMEONE TO STAY WITH YOU THE DAY OF THE PROCEDURE AFTER YOU GO HOME     - FOLLOW ANY OTHER INSTRUCTIONS GIVEN TO YOU BY YOUR SURGEON'S OFFICE.     Main Entrance [facility], Take elevator to first floor, turn left and check in at registration   - YOU WILL RECEIVE A PHONE CALL THE DAY PRIOR TO SURGERY BETWEEN 1PM AND 4 PM WITH ARRIVAL TIME, IF YOUR SURGERY IS ON A MONDAY YOU WILL RECEIVE A CALL THE FRIDAY PRIOR TO SURGERY DATE    - BRING CASH OR CREDIT CARD FOR COPAYMENT OF MEDICATIONS AFTER SURGERY IF YOU USE THE HOSPITAL PHARMACY (MEDS TO BED)    - PREADMISSION TESTING NURSE MARIO CORTEZ [phone] IF HAVE ANY QUESTIONS     -PATIENT PROVIDED THE NUMBER FOR PREOP SURGICAL DEPT IF HAD QUESTIONS AFTER HOURS PRIOR TO SURGERY ([phone] ).  INFORMED PT IF NO ANSWER, LEAVE A MESSAGE AND SOMEONE WILL RETURN THEIR CALL       PATIENT VERBALIZED UNDERSTANDING

## 2025-07-21 ENCOUNTER — TELEPHONE (OUTPATIENT)
Dept: GASTROENTEROLOGY | Facility: CLINIC | Age: 65
End: 2025-07-21
Payer: COMMERCIAL

## 2025-07-21 NOTE — TELEPHONE ENCOUNTER
ENDO RECONCILIATION  Verify source of procedure(s): Nurse/MA direct access 7/2025  If other, please list source: Per Dr Conner-see TE 7.11.2025  TIME OUT-CONFIRM CORRECT PROCEDURE: EGD  Cardiology: none  Pulmonology: none  Blood thinner:  none  GLP-1: Mounjaro  Additional DX/indication for procedure: Nausea/Vomiting, Recent ER visit per Dr Conner  Please include any other notes relevant to endo reconciliation:  N/A

## 2025-07-22 ENCOUNTER — ANESTHESIA EVENT (OUTPATIENT)
Dept: PERIOP | Facility: HOSPITAL | Age: 65
End: 2025-07-22
Payer: COMMERCIAL

## 2025-07-22 ENCOUNTER — HOSPITAL ENCOUNTER (OUTPATIENT)
Facility: HOSPITAL | Age: 65
Setting detail: HOSPITAL OUTPATIENT SURGERY
Discharge: HOME | End: 2025-07-22
Attending: UROLOGY | Admitting: UROLOGY
Payer: COMMERCIAL

## 2025-07-22 ENCOUNTER — ANESTHESIA (OUTPATIENT)
Dept: PERIOP | Facility: HOSPITAL | Age: 65
End: 2025-07-22
Payer: COMMERCIAL

## 2025-07-22 ENCOUNTER — APPOINTMENT (OUTPATIENT)
Dept: GENERAL RADIOLOGY | Facility: HOSPITAL | Age: 65
End: 2025-07-22
Payer: COMMERCIAL

## 2025-07-22 VITALS
WEIGHT: 166.01 LBS | SYSTOLIC BLOOD PRESSURE: 141 MMHG | HEIGHT: 63 IN | TEMPERATURE: 98.1 F | BODY MASS INDEX: 29.41 KG/M2 | OXYGEN SATURATION: 96 % | HEART RATE: 46 BPM | RESPIRATION RATE: 16 BRPM | DIASTOLIC BLOOD PRESSURE: 65 MMHG

## 2025-07-22 DIAGNOSIS — N20.0 KIDNEY STONE: ICD-10-CM

## 2025-07-22 LAB
GLUCOSE BLDC GLUCOMTR-MCNC: 95 MG/DL (ref 70–99)
QT INTERVAL: 439 MS
QTC INTERVAL: 440 MS

## 2025-07-22 PROCEDURE — 25010000002 MIDAZOLAM PER 1MG: Performed by: ANESTHESIOLOGY

## 2025-07-22 PROCEDURE — 76000 FLUOROSCOPY <1 HR PHYS/QHP: CPT

## 2025-07-22 PROCEDURE — 25010000002 CEFAZOLIN PER 500 MG: Performed by: NURSE PRACTITIONER

## 2025-07-22 PROCEDURE — 25010000002 PROPOFOL 10 MG/ML EMULSION: Performed by: NURSE ANESTHETIST, CERTIFIED REGISTERED

## 2025-07-22 PROCEDURE — C1758 CATHETER, URETERAL: HCPCS | Performed by: UROLOGY

## 2025-07-22 PROCEDURE — C2617 STENT, NON-COR, TEM W/O DEL: HCPCS | Performed by: UROLOGY

## 2025-07-22 PROCEDURE — 25010000002 ONDANSETRON PER 1 MG: Performed by: NURSE ANESTHETIST, CERTIFIED REGISTERED

## 2025-07-22 PROCEDURE — C1769 GUIDE WIRE: HCPCS | Performed by: UROLOGY

## 2025-07-22 PROCEDURE — 25010000002 DEXAMETHASONE PER 1 MG: Performed by: NURSE ANESTHETIST, CERTIFIED REGISTERED

## 2025-07-22 PROCEDURE — 25810000003 LACTATED RINGERS PER 1000 ML: Performed by: ANESTHESIOLOGY

## 2025-07-22 PROCEDURE — 88300 SURGICAL PATH GROSS: CPT | Performed by: UROLOGY

## 2025-07-22 PROCEDURE — 25010000002 FENTANYL CITRATE (PF) 50 MCG/ML SOLUTION: Performed by: NURSE ANESTHETIST, CERTIFIED REGISTERED

## 2025-07-22 PROCEDURE — 93005 ELECTROCARDIOGRAM TRACING: CPT | Performed by: ANESTHESIOLOGY

## 2025-07-22 PROCEDURE — C1894 INTRO/SHEATH, NON-LASER: HCPCS | Performed by: UROLOGY

## 2025-07-22 PROCEDURE — 52356 CYSTO/URETERO W/LITHOTRIPSY: CPT | Performed by: UROLOGY

## 2025-07-22 PROCEDURE — 25010000002 LIDOCAINE PF 2% 2 % SOLUTION: Performed by: NURSE ANESTHETIST, CERTIFIED REGISTERED

## 2025-07-22 PROCEDURE — 82365 CALCULUS SPECTROSCOPY: CPT | Performed by: UROLOGY

## 2025-07-22 PROCEDURE — 82948 REAGENT STRIP/BLOOD GLUCOSE: CPT | Performed by: ANESTHESIOLOGY

## 2025-07-22 PROCEDURE — 25010000002 SUGAMMADEX 200 MG/2ML SOLUTION: Performed by: NURSE ANESTHETIST, CERTIFIED REGISTERED

## 2025-07-22 DEVICE — URETERAL STENT
Type: IMPLANTABLE DEVICE | Site: URETER | Status: FUNCTIONAL
Brand: ASCERTA™

## 2025-07-22 RX ORDER — ACETAMINOPHEN 325 MG/1
650 TABLET ORAL ONCE
Status: DISCONTINUED | OUTPATIENT
Start: 2025-07-22 | End: 2025-07-22

## 2025-07-22 RX ORDER — ROCURONIUM BROMIDE 10 MG/ML
INJECTION, SOLUTION INTRAVENOUS AS NEEDED
Status: DISCONTINUED | OUTPATIENT
Start: 2025-07-22 | End: 2025-07-22 | Stop reason: SURG

## 2025-07-22 RX ORDER — FENTANYL CITRATE 50 UG/ML
INJECTION, SOLUTION INTRAMUSCULAR; INTRAVENOUS AS NEEDED
Status: DISCONTINUED | OUTPATIENT
Start: 2025-07-22 | End: 2025-07-22 | Stop reason: SURG

## 2025-07-22 RX ORDER — ACETAMINOPHEN 325 MG/1
650 TABLET ORAL ONCE AS NEEDED
Status: DISCONTINUED | OUTPATIENT
Start: 2025-07-22 | End: 2025-07-22 | Stop reason: HOSPADM

## 2025-07-22 RX ORDER — SCOPOLAMINE 1 MG/3D
1 PATCH, EXTENDED RELEASE TRANSDERMAL ONCE
Status: DISCONTINUED | OUTPATIENT
Start: 2025-07-22 | End: 2025-07-22 | Stop reason: HOSPADM

## 2025-07-22 RX ORDER — SODIUM CHLORIDE 0.9 % (FLUSH) 0.9 %
10 SYRINGE (ML) INJECTION AS NEEDED
Status: DISCONTINUED | OUTPATIENT
Start: 2025-07-22 | End: 2025-07-22 | Stop reason: HOSPADM

## 2025-07-22 RX ORDER — HYDRALAZINE HYDROCHLORIDE 20 MG/ML
5 INJECTION INTRAMUSCULAR; INTRAVENOUS
Status: DISCONTINUED | OUTPATIENT
Start: 2025-07-22 | End: 2025-07-22 | Stop reason: HOSPADM

## 2025-07-22 RX ORDER — ONDANSETRON 2 MG/ML
INJECTION INTRAMUSCULAR; INTRAVENOUS AS NEEDED
Status: DISCONTINUED | OUTPATIENT
Start: 2025-07-22 | End: 2025-07-22

## 2025-07-22 RX ORDER — MIDAZOLAM HYDROCHLORIDE 2 MG/2ML
2 INJECTION, SOLUTION INTRAMUSCULAR; INTRAVENOUS ONCE
Status: COMPLETED | OUTPATIENT
Start: 2025-07-22 | End: 2025-07-22

## 2025-07-22 RX ORDER — SODIUM CHLORIDE, SODIUM LACTATE, POTASSIUM CHLORIDE, CALCIUM CHLORIDE 600; 310; 30; 20 MG/100ML; MG/100ML; MG/100ML; MG/100ML
9 INJECTION, SOLUTION INTRAVENOUS CONTINUOUS PRN
Status: DISCONTINUED | OUTPATIENT
Start: 2025-07-22 | End: 2025-07-22 | Stop reason: HOSPADM

## 2025-07-22 RX ORDER — SODIUM CHLORIDE 9 MG/ML
40 INJECTION, SOLUTION INTRAVENOUS AS NEEDED
Status: DISCONTINUED | OUTPATIENT
Start: 2025-07-22 | End: 2025-07-22 | Stop reason: HOSPADM

## 2025-07-22 RX ORDER — HYDROCODONE BITARTRATE AND ACETAMINOPHEN 5; 325 MG/1; MG/1
1-2 TABLET ORAL EVERY 4 HOURS PRN
Qty: 20 TABLET | Refills: 0 | Status: SHIPPED | OUTPATIENT
Start: 2025-07-22

## 2025-07-22 RX ORDER — PROMETHAZINE HYDROCHLORIDE 12.5 MG/1
12.5 TABLET ORAL ONCE AS NEEDED
Status: DISCONTINUED | OUTPATIENT
Start: 2025-07-22 | End: 2025-07-22

## 2025-07-22 RX ORDER — PROCHLORPERAZINE EDISYLATE 5 MG/ML
10 INJECTION INTRAMUSCULAR; INTRAVENOUS ONCE AS NEEDED
Status: DISCONTINUED | OUTPATIENT
Start: 2025-07-22 | End: 2025-07-22 | Stop reason: HOSPADM

## 2025-07-22 RX ORDER — LABETALOL HYDROCHLORIDE 5 MG/ML
5 INJECTION, SOLUTION INTRAVENOUS
Status: DISCONTINUED | OUTPATIENT
Start: 2025-07-22 | End: 2025-07-22 | Stop reason: HOSPADM

## 2025-07-22 RX ORDER — ACETAMINOPHEN 325 MG/1
650 TABLET ORAL ONCE
Status: DISCONTINUED | OUTPATIENT
Start: 2025-07-22 | End: 2025-07-22 | Stop reason: HOSPADM

## 2025-07-22 RX ORDER — ACETAMINOPHEN 500 MG
1000 TABLET ORAL ONCE
Status: COMPLETED | OUTPATIENT
Start: 2025-07-22 | End: 2025-07-22

## 2025-07-22 RX ORDER — HYDROCODONE BITARTRATE AND ACETAMINOPHEN 5; 325 MG/1; MG/1
1 TABLET ORAL ONCE AS NEEDED
Refills: 0 | Status: COMPLETED | OUTPATIENT
Start: 2025-07-22 | End: 2025-07-22

## 2025-07-22 RX ORDER — PROPOFOL 10 MG/ML
VIAL (ML) INTRAVENOUS AS NEEDED
Status: DISCONTINUED | OUTPATIENT
Start: 2025-07-22 | End: 2025-07-22 | Stop reason: SURG

## 2025-07-22 RX ORDER — ONDANSETRON 2 MG/ML
4 INJECTION INTRAMUSCULAR; INTRAVENOUS ONCE AS NEEDED
Status: DISCONTINUED | OUTPATIENT
Start: 2025-07-22 | End: 2025-07-22 | Stop reason: HOSPADM

## 2025-07-22 RX ORDER — IBUPROFEN 600 MG/1
600 TABLET, FILM COATED ORAL EVERY 6 HOURS PRN
Status: DISCONTINUED | OUTPATIENT
Start: 2025-07-22 | End: 2025-07-22 | Stop reason: HOSPADM

## 2025-07-22 RX ORDER — LIDOCAINE HYDROCHLORIDE 20 MG/ML
INJECTION, SOLUTION EPIDURAL; INFILTRATION; INTRACAUDAL; PERINEURAL AS NEEDED
Status: DISCONTINUED | OUTPATIENT
Start: 2025-07-22 | End: 2025-07-22 | Stop reason: SURG

## 2025-07-22 RX ORDER — PROMETHAZINE HYDROCHLORIDE 12.5 MG/1
12.5 TABLET ORAL ONCE AS NEEDED
Status: DISCONTINUED | OUTPATIENT
Start: 2025-07-22 | End: 2025-07-22 | Stop reason: HOSPADM

## 2025-07-22 RX ORDER — SODIUM CHLORIDE 0.9 % (FLUSH) 0.9 %
10 SYRINGE (ML) INJECTION EVERY 12 HOURS SCHEDULED
Status: DISCONTINUED | OUTPATIENT
Start: 2025-07-22 | End: 2025-07-22 | Stop reason: HOSPADM

## 2025-07-22 RX ORDER — DEXAMETHASONE SODIUM PHOSPHATE 4 MG/ML
INJECTION, SOLUTION INTRA-ARTICULAR; INTRALESIONAL; INTRAMUSCULAR; INTRAVENOUS; SOFT TISSUE AS NEEDED
Status: DISCONTINUED | OUTPATIENT
Start: 2025-07-22 | End: 2025-07-22 | Stop reason: SURG

## 2025-07-22 RX ORDER — IBUPROFEN 600 MG/1
600 TABLET, FILM COATED ORAL EVERY 6 HOURS PRN
Status: DISCONTINUED | OUTPATIENT
Start: 2025-07-22 | End: 2025-07-22

## 2025-07-22 RX ORDER — MAGNESIUM HYDROXIDE 1200 MG/15ML
LIQUID ORAL AS NEEDED
Status: DISCONTINUED | OUTPATIENT
Start: 2025-07-22 | End: 2025-07-22 | Stop reason: HOSPADM

## 2025-07-22 RX ADMIN — DEXAMETHASONE SODIUM PHOSPHATE 4 MG: 4 INJECTION, SOLUTION INTRAMUSCULAR; INTRAVENOUS at 13:15

## 2025-07-22 RX ADMIN — FENTANYL CITRATE 50 MCG: 50 INJECTION, SOLUTION INTRAMUSCULAR; INTRAVENOUS at 13:15

## 2025-07-22 RX ADMIN — SODIUM CHLORIDE 2000 MG: 9 INJECTION, SOLUTION INTRAVENOUS at 13:08

## 2025-07-22 RX ADMIN — FENTANYL CITRATE 50 MCG: 50 INJECTION, SOLUTION INTRAMUSCULAR; INTRAVENOUS at 13:33

## 2025-07-22 RX ADMIN — SODIUM CHLORIDE, POTASSIUM CHLORIDE, SODIUM LACTATE AND CALCIUM CHLORIDE 9 ML/HR: 600; 310; 30; 20 INJECTION, SOLUTION INTRAVENOUS at 11:03

## 2025-07-22 RX ADMIN — ROCURONIUM BROMIDE 50 MG: 10 INJECTION, SOLUTION INTRAVENOUS at 13:07

## 2025-07-22 RX ADMIN — SUGAMMADEX 200 MG: 100 INJECTION, SOLUTION INTRAVENOUS at 13:42

## 2025-07-22 RX ADMIN — ACETAMINOPHEN 1000 MG: 500 TABLET ORAL at 11:02

## 2025-07-22 RX ADMIN — PROPOFOL 75 MCG/KG/MIN: 10 INJECTION, EMULSION INTRAVENOUS at 13:12

## 2025-07-22 RX ADMIN — PROPOFOL 150 MG: 10 INJECTION, EMULSION INTRAVENOUS at 13:07

## 2025-07-22 RX ADMIN — HYDROCODONE BITARTRATE AND ACETAMINOPHEN 1 TABLET: 5; 325 TABLET ORAL at 14:36

## 2025-07-22 RX ADMIN — SCOPOLAMINE 1 PATCH: 1.5 PATCH, EXTENDED RELEASE TRANSDERMAL at 11:02

## 2025-07-22 RX ADMIN — LIDOCAINE HYDROCHLORIDE 100 MG: 20 INJECTION, SOLUTION INTRAVENOUS at 13:07

## 2025-07-22 RX ADMIN — MIDAZOLAM HYDROCHLORIDE 2 MG: 1 INJECTION, SOLUTION INTRAMUSCULAR; INTRAVENOUS at 13:00

## 2025-07-22 RX ADMIN — ONDANSETRON 4 MG: 2 INJECTION INTRAMUSCULAR; INTRAVENOUS at 13:15

## 2025-07-22 NOTE — ANESTHESIA POSTPROCEDURE EVALUATION
Patient: Vira Yates    Procedure Summary       Date: 07/22/25 Room / Location: [facility] OR 08 / [facility] MAIN OR    Anesthesia Start: 1304 Anesthesia Stop: 1359    Procedure: URETEROSCOPY LASER LITHOTRIPSY WITH STENT INSERTION (Left) Diagnosis:       Kidney stone      (Kidney stone [N20.0])    Surgeons: Zee Hollins MD Provider: Susanne Madrid MD    Anesthesia Type: general ASA Status: 3            Anesthesia Type: general    Vitals  Vitals Value Taken Time   /58 07/22/25 14:03   Temp     Pulse 57 07/22/25 14:03   Resp     SpO2 95 % 07/22/25 14:03   Vitals shown include unfiled device data.        Post Anesthesia Care and Evaluation    Patient location during evaluation: bedside  Patient participation: complete - patient participated  Level of consciousness: awake  Pain management: adequate    Airway patency: patent  PONV Status: none  Cardiovascular status: acceptable and stable  Respiratory status: acceptable  Hydration status: acceptable

## 2025-07-22 NOTE — OP NOTE
URETEROSCOPY LASER LITHOTRIPSY WITH STENT INSERTION  Procedure Report    Patient Name:  Vira Yates  YOB: 1960    Date of Surgery:  7/22/2025     Pre-op Diagnosis:   Kidney stone [N20.0]       Post-Op Diagnosis Codes:     * Kidney stone [N20.0]      Procedure/CPT® Codes:    Procedure(s):  LEFT URETEROSCOPY LASER LITHOTRIPSY, STONE BASKET EXTRACTION WITH STENT INSERTION      Staff:  Surgeon(s):  Zee Hollins MD         Anesthesia: General    Estimated Blood Loss: 0 mL    Implants:    Implant Name Type Inv. Item Serial No.  Lot No. LRB No. Used Action   STNT URETRL ASCERTA 4.8F 24CM - [id number] Stent STNT URETRL ASCERTA 4.8F 24CM  [email or web address] [id number] Left 1 Implanted       Specimen:          Specimens       ID Source Type Tests Collected By Collected At Frozen?    A Ureter, Left Calculus TISSUE PATHOLOGY EXAM  STONE ANALYSIS   Zee Hollins MD 7/22/25 0028     Description: left ureteral stones                Complications: None    Description of Procedure:     After proper consent was obtained, patient was taken to operating room and placed in the dorsal lithotomy position.  The patient was prepped and draped in the normal sterile fashion for a left ureteroscopy.      A 22 [place] rigid cystoscopy was passed per urethra into the bladder.  The bladder was inspected in a systemic meridian fashion.  No stones, tumors or other abnormalities were seen.      A glide wire was passed up the left ureteral orifice without difficulty.  A dual lumen catheter was placed and a second wire was placed as a safety wire.  Over the working wire a ureteral access sheath was passed.  A flexible scope was then passed into the ureter.  There was a stone encountered in the left upper pole.  There were no other stones seen.     A 270 laser fiber was used to break the stone up into several small pieces.  A stone basket was placed into the ureter and the stones were grasped and  removed.      There was no evidence of injury to the ureter.  The ureteroscope was removed.  Over the wire, a 4.8 Citizen of the [place] 24 cm stent was passed.  Under fluoroscopy it was seen curling in the renal pelvis and under cystoscopy was seen curling in the bladder.  The cystoscope was removed.      A string was  left on the stent.      The patient tolerated the procedure well and was transferred to the PACU in stable condition.            Zee Hollins MD     Date: 7/22/2025  Time: 14:20 EDT

## 2025-07-22 NOTE — ANESTHESIA PREPROCEDURE EVALUATION
Anesthesia Evaluation     Patient summary reviewed and Nursing notes reviewed   history of anesthetic complications:  PONV  NPO Solid Status: > 8 hours  NPO Liquid Status: > 2 hours           Airway   Mallampati: II  TM distance: <3 FB  Neck ROM: full  No difficulty expected  Dental - normal exam     Pulmonary - negative pulmonary ROS and normal exam    breath sounds clear to auscultation  Cardiovascular - normal exam  Exercise tolerance: good (4-7 METS)    ECG reviewed  Rhythm: regular  Rate: normal    (+) hypertension (bisoprolol/hctz, losartan) 2 medications or greater  (-) past MI, angina    ROS comment: EK bpm  Sinus rhythm  Left bundle branch block  Date and Time of Study:2025 10:29:41      Neuro/Psych- negative ROS  GI/Hepatic/Renal/Endo    (+) GERD (Was having GERD, diarrhea but has resolved now; Upper GI scheduled) well controlled, renal disease- stones, diabetes mellitus (Mounjaro (LD >1 week ago)) type 2 well controlled, thyroid problem hypothyroidism    Musculoskeletal     Abdominal  - normal exam   Substance History - negative use     OB/GYN negative ob/gyn ROS         Other   arthritis,     ROS/Med Hx Other: PAT Nursing Notes unavailable.     25 09:26  Sodium: 140  Potassium: 3.6  Chloride: 103  CO2: 25.6  Anion Gap: 11.4  BUN: 14.5  Creatinine: 0.76  BUN/Creatinine Ratio: 19.1  eGFR: 88.2  Glucose: 92  Calcium: 10.1    Alkaline Phosphatase: 144 (H)  Total Protein: 7.8  Albumin: 4.6  Globulin: 3.2  A/G Ratio: 1.4  AST (SGOT): 23  ALT (SGPT): 11  Total Bilirubin: 0.9  Lactate: 1.6  Lipase: 18    WBC: 10.15  RBC: 5.41 (H)  Hemoglobin: 16.6 (H)  Hematocrit: 47.3 (H)  Platelets: 219  RDW: 12.9  MCV: 87.4  MCH: 30.7  MCHC: 35.1  MPV: 10.9  RDW-SD: 41.3    Neutrophil Rel %: 59.3  Lymphocyte Rel %: 28.7  Monocyte Rel %: 7.2  Eosinophil Rel %: 4.3  Basophil Rel %: 0.2  Immature Granulocyte Rel %: 0.3  Neutrophils Absolute: 6.02  Lymphocytes Absolute: 2.91  Monocytes Absolute:  0.73  Eosinophils Absolute: 0.44 (H)  Basophils Absolute: 0.02  Immature Grans, Absolute: 0.03  nRBC: 0.0      (H): Data is abnormally high                Anesthesia Plan    ASA 3     general   total IV anesthesia  (Patient understands anesthesia not responsible for dental damage.)  intravenous induction     Anesthetic plan, risks, benefits, and alternatives have been provided, discussed and informed consent has been obtained with: patient.    Use of blood products discussed with patient .    Plan discussed with CRNA.      CODE STATUS:

## 2025-07-22 NOTE — ANESTHESIA POSTPROCEDURE EVALUATION
Patient: Vira Yates    Procedure Summary       Date: 07/22/25 Room / Location: [facility] OR 08 / [facility] MAIN OR    Anesthesia Start: 1304 Anesthesia Stop: 1359    Procedure: URETEROSCOPY LASER LITHOTRIPSY WITH STENT INSERTION (Left) Diagnosis:       Kidney stone      (Kidney stone [N20.0])    Surgeons: Zee Hollins MD Provider: Susanne Madrid MD    Anesthesia Type: general ASA Status: 3            Anesthesia Type: general    Vitals  Vitals Value Taken Time   /68 07/22/25 13:58   Temp     Pulse 63 07/22/25 13:59   Resp     SpO2 84 % 07/22/25 13:59   Vitals shown include unfiled device data.        Post Anesthesia Care and Evaluation    Patient location during evaluation: bedside  Patient participation: complete - patient participated  Level of consciousness: awake  Pain management: adequate    Airway patency: patent  Anesthetic complications: No anesthetic complications  PONV Status: none  Cardiovascular status: acceptable and stable  Respiratory status: acceptable  Hydration status: acceptable

## 2025-07-22 NOTE — DISCHARGE INSTRUCTIONS
URETERAL STENT TEACHING SHEET   Frequently Asked Questions about Ureteral Stents          What is a ureteral stent?  A ureteral stent is a small, soft tube that is about 10-12 inches long and about as big around as a swizzle stick. It is placed in the ureter, which is the muscular tube that drains urine from the kidney to the bladder.  Each end of the stent is shaped like a pigtail. One end of the tube sits inside the kidney, and the other end sits in the bladder. The purpose of the stent is to hold the ureter open and maintain proper drainage of urine.  It usually is temporary but may be used long term.  This decision will be made by your doctor.  When is the stent used?  A stent is used in a number of situations.  A stent is placed if the doctor is concerned that urine might not drain well through the ureter, due to blockage or as a reaction to surgery.  Stents usually will be placed in a ureter that has been irritated during a procedure that involves removal of a stone.  Stents for this reason are usually left in place for about a week.  These stents ensure that the ureter does not spasm and collapse after the trauma of the procedure.  Does the stent cause any symptoms?  Many patients do feel the stent.  Most commonly there is bladder irritation, typically causing frequent and/or uncomfortable urination and a sensation of pressure over the bladder or in the lower abdomen. Bloody urine is common. Some patients experience pain in the kidney during urination. There can be urinary tract infections associated with the stent so it is very important that you practice good hygiene. Once the stent is removed, all of the symptoms associated with the stent will quite rapidly disappear (oftentimes immediately). While the stent is in place, you may carry on with most normal activities, including work.  Strenuous activity may cause discomfort. Showering is preferred to tub baths while your stent is in place. If you must  take a tub bath, do not use bubble baths or oils as they may lead to infection.       When should the stent be removed?  In some cases the stent can be removed just a few days after the procedure, while in other cases your doctor may recommend that it stay in place for longer periods of time.  You must follow-up with your doctor as directed when you have a stent since stents left in place for too long can lead to blockage, stone formation, urinary infections and ultimately, kidney failure if they are not removed. If your stent passes by itself when you urinate, or you inadvertently pull the string and begin to have large amounts of urine leakage, follow the procedure below to remove the stent.  How is the stent removed?  In some instances, your doctor may decide to leave a string on the stent.  The string is attached to the stent and the string comes out of the urethra (the natural hole where urine exits the body).  The doctor may tell you to remove the stent at home on a given day.  Stents with the string may be removed in a matter of seconds by pulling on the string.  When removing the stent, constant, steady force should be applied, to avoid starting and stopping. Something should be placed below the patient to catch any urine that leaks during removal.  You may choose to remove the stent in the shower, just be sure to have someone close at hand in case you become weak or dizzy.  After the stent is removed, it should be placed in a sealed bag and taken with you to your next office visit.  On the rare occasion that the string breaks and the stent doesn't come out, you should call your doctors office. You should urinate within 4-6 hours after your stent is removed. For this reason, your stent should be removed early in the day.  If you are unable to urinate within 6 hours, call your doctor.   Stents without a string can be removed in the office using a cystoscope. Cystoscopy involves placement of a small flexible  tube through the urethra (the hole where urine exits the body). The procedure, which usually only takes a few minutes and causes little discomfort, is performed in the office. Most patients tolerate having the stent removed using only a topical anesthetic instilled into the urethra. Since no IV line is inserted and there is no anesthesia, you do not have to be accompanied by anyone else and you can eat normally before and after the procedure.  Your doctor may choose to have you return to the hospital to have your stent removed. In this case, you will receive anesthesia and must not eat or drink anything after midnight.   DISCHARGE INSTRUCTIONS  Ureteroscopy Lasertripsy  Stent Placement      For your surgery you had:  General anesthesia (you may have a sore throat for the first 24 hours)  IV sedation  Local anesthesia  Monitored anesthesia care  You received a medicated path for nausea prevention today (behind your ear). It is recommended that you remove it 24-48 hours post-operatively. It must be removed within 72 hours.   You have received an anesthesia medication today that can cause hormonal forms of birth control to be ineffective. You should use a different form of birth control (to prevent pregnancy) for 7 days.   You may experience dizziness, drowsiness, or lightheadedness for several hours following surgery.  Do not stay alone today or tonight.  Limit your activity for 24 hours.  You should not drive or operate machinery, drink alcohol, or sign legally binding documents for 24 hours or while you are taking pain medication.  Resume your diet slowly.  Follow any special dietary instructions you may have been given by your doctor.     NOTIFY YOUR DOCTOR IF YOU EXPERIENCE ANY OF THE FOLLOWING:  Temperature greater than 101 degrees Fahrenheit  Shaking Chills  Redness or excessive drainage from incision  Nausea, vomiting and/or pain that is not controlled by prescribed medications  Increase in bleeding or bleeding  that is excessive  Unable to urinate in 6 hours after surgery  If unable to reach your doctor, please go to the closest Emergency Room  Strain urine if instructed by physician.  Collect any fragments and take with you on your scheduled appointment. You may pass stone pieces or small blood clots.  Blood in your urine is normal.  It could be light pink to cherry color.  Drink 6-8 glasses of fluid each day to assist with passing of stone fragments.  Back pain is common.  It may feel like a dull ache or back spasm.  Urine will be bloody for several days.  Slight redness or bruising may be noticed on treated side.  If you have difficulty urinating, try sitting in a bathtub of warm water.    If you have a stent, it must be managed by your urologist.  Do NOT forget.  Medications per physician instructions as indicated on Discharge Medication Information Sheet.    Last dose of pain medication was given at:   .    SPECIAL INSTRUCTIONS:

## 2025-07-23 ENCOUNTER — ANESTHESIA EVENT (OUTPATIENT)
Dept: GASTROENTEROLOGY | Facility: HOSPITAL | Age: 65
End: 2025-07-23
Payer: COMMERCIAL

## 2025-07-23 NOTE — ANESTHESIA PREPROCEDURE EVALUATION
Anesthesia Evaluation     Patient summary reviewed and Nursing notes reviewed   history of anesthetic complications:   NPO Solid Status: > 8 hours  NPO Liquid Status: > 2 hours           Airway   Mallampati: II  TM distance: >3 FB  Neck ROM: full  No difficulty expected  Dental - normal exam   (+) partials    Pulmonary - negative pulmonary ROS and normal exam    breath sounds clear to auscultation  Cardiovascular - normal exam  Exercise tolerance: good (4-7 METS)    Patient on routine beta blocker  Rhythm: regular  Rate: normal    (+) hypertension well controlled 2 medications or greater      Neuro/Psych- negative ROS  GI/Hepatic/Renal/Endo    (+) GERD well controlled, renal disease- stones, diabetes mellitus type 2 well controlled, thyroid problem hypothyroidism    Musculoskeletal     Abdominal  - normal exam   Substance History - negative use     OB/GYN negative ob/gyn ROS         Other   arthritis,     ROS/Med Hx Other: EKG 7/22/25  Sinus rhythm . Left bundle branch block . No previous ECG available for comparison    Last dose of mounjaro 2 July 25                  Anesthesia Plan    ASA 3     general   total IV anesthesia  (Risks explained including allergic reactions, BP, HR, O2 changes, aspiration, apnea, advanced airway placement. Pt verbalized understanding. Patient understands anesthesia not responsible for dental damage.)  intravenous induction     Anesthetic plan, risks, benefits, and alternatives have been provided, discussed and informed consent has been obtained with: patient.  Pre-procedure education provided  Plan discussed with CRNA.      CODE STATUS:

## 2025-07-24 ENCOUNTER — HOSPITAL ENCOUNTER (OUTPATIENT)
Facility: HOSPITAL | Age: 65
Setting detail: HOSPITAL OUTPATIENT SURGERY
Discharge: HOME | End: 2025-07-24
Attending: INTERNAL MEDICINE | Admitting: INTERNAL MEDICINE
Payer: COMMERCIAL

## 2025-07-24 ENCOUNTER — ANESTHESIA (OUTPATIENT)
Dept: GASTROENTEROLOGY | Facility: HOSPITAL | Age: 65
End: 2025-07-24
Payer: COMMERCIAL

## 2025-07-24 VITALS
TEMPERATURE: 97.6 F | DIASTOLIC BLOOD PRESSURE: 72 MMHG | RESPIRATION RATE: 15 BRPM | WEIGHT: 166.45 LBS | HEART RATE: 63 BPM | BODY MASS INDEX: 29.48 KG/M2 | SYSTOLIC BLOOD PRESSURE: 118 MMHG | OXYGEN SATURATION: 98 %

## 2025-07-24 DIAGNOSIS — R11.10 VOMITING, UNSPECIFIED VOMITING TYPE, UNSPECIFIED WHETHER NAUSEA PRESENT: ICD-10-CM

## 2025-07-24 DIAGNOSIS — R11.0 NAUSEA: ICD-10-CM

## 2025-07-24 LAB
GLUCOSE BLDC GLUCOMTR-MCNC: 78 MG/DL (ref 70–99)
LAB AP CASE REPORT: NORMAL
LAB AP CLINICAL INFORMATION: NORMAL
PATH REPORT.FINAL DX SPEC: NORMAL
PATH REPORT.GROSS SPEC: NORMAL

## 2025-07-24 PROCEDURE — 25810000003 LACTATED RINGERS PER 1000 ML

## 2025-07-24 PROCEDURE — 82948 REAGENT STRIP/BLOOD GLUCOSE: CPT

## 2025-07-24 PROCEDURE — 25010000002 PROPOFOL 10 MG/ML EMULSION: Performed by: NURSE ANESTHETIST, CERTIFIED REGISTERED

## 2025-07-24 PROCEDURE — 88305 TISSUE EXAM BY PATHOLOGIST: CPT | Performed by: INTERNAL MEDICINE

## 2025-07-24 PROCEDURE — 43239 EGD BIOPSY SINGLE/MULTIPLE: CPT | Performed by: INTERNAL MEDICINE

## 2025-07-24 PROCEDURE — 25010000002 LIDOCAINE PF 2% 2 % SOLUTION: Performed by: NURSE ANESTHETIST, CERTIFIED REGISTERED

## 2025-07-24 RX ORDER — ESOMEPRAZOLE MAGNESIUM 40 MG/1
40 CAPSULE, DELAYED RELEASE ORAL DAILY
Qty: 9 CAPSULE | Refills: 3 | Status: SHIPPED | OUTPATIENT
Start: 2025-07-24 | End: 2025-07-25 | Stop reason: SDUPTHER

## 2025-07-24 RX ORDER — PROPOFOL 10 MG/ML
VIAL (ML) INTRAVENOUS AS NEEDED
Status: DISCONTINUED | OUTPATIENT
Start: 2025-07-24 | End: 2025-07-24 | Stop reason: SURG

## 2025-07-24 RX ORDER — EPHEDRINE SULFATE 50 MG/ML
INJECTION INTRAVENOUS AS NEEDED
Status: DISCONTINUED | OUTPATIENT
Start: 2025-07-24 | End: 2025-07-24 | Stop reason: SURG

## 2025-07-24 RX ORDER — LIDOCAINE HYDROCHLORIDE 20 MG/ML
INJECTION, SOLUTION EPIDURAL; INFILTRATION; INTRACAUDAL; PERINEURAL AS NEEDED
Status: DISCONTINUED | OUTPATIENT
Start: 2025-07-24 | End: 2025-07-24 | Stop reason: SURG

## 2025-07-24 RX ORDER — SODIUM CHLORIDE, SODIUM LACTATE, POTASSIUM CHLORIDE, CALCIUM CHLORIDE 600; 310; 30; 20 MG/100ML; MG/100ML; MG/100ML; MG/100ML
30 INJECTION, SOLUTION INTRAVENOUS CONTINUOUS
Status: DISCONTINUED | OUTPATIENT
Start: 2025-07-24 | End: 2025-07-24 | Stop reason: HOSPADM

## 2025-07-24 RX ADMIN — LIDOCAINE HYDROCHLORIDE 100 MG: 20 INJECTION, SOLUTION INTRAVENOUS at 14:00

## 2025-07-24 RX ADMIN — PROPOFOL 70 MG: 10 INJECTION, EMULSION INTRAVENOUS at 14:00

## 2025-07-24 RX ADMIN — PROPOFOL 200 MCG/KG/MIN: 10 INJECTION, EMULSION INTRAVENOUS at 14:00

## 2025-07-24 RX ADMIN — SODIUM CHLORIDE, POTASSIUM CHLORIDE, SODIUM LACTATE AND CALCIUM CHLORIDE: 600; 310; 30; 20 INJECTION, SOLUTION INTRAVENOUS at 13:58

## 2025-07-24 RX ADMIN — EPHEDRINE SULFATE 5 MG: 50 INJECTION INTRAVENOUS at 14:13

## 2025-07-24 NOTE — H&P
Pre Procedure History & Physical    Chief Complaint:   Nausea  Vomiting  gerd    Subjective     HPI:   As above    Past Medical History:   Past Medical History:   Diagnosis Date    Arthritis     oseto and rheumatoid    Diabetes mellitus     Disease of thyroid gland     Fibroid     GERD (gastroesophageal reflux disease)     Hypertension     At least 15 years ago    Kidney stone     PONV (postoperative nausea and vomiting)     TMJ (dislocation of temporomandibular joint)        Past Surgical History:  Past Surgical History:   Procedure Laterality Date    CHOLECYSTECTOMY  1992    COLONOSCOPY N/A 3/20/2023    Procedure: COLONOSCOPY with polypectomy with hot/cold snares with resolution clip x's 1;  Surgeon: Idris Conner MD;  Location: [facility] ENDOSCOPY;  Service: Gastroenterology;  Laterality: N/A;  colon polyps, diverticulosis    ENDOMETRIAL ABLATION N/A     TONSILLECTOMY      TUBAL ABDOMINAL LIGATION  2005    URETEROSCOPY LASER LITHOTRIPSY WITH STENT INSERTION Left 7/22/2025    Procedure: URETEROSCOPY LASER LITHOTRIPSY WITH STENT INSERTION;  Surgeon: Zee Hollins MD;  Location: [facility] MAIN OR;  Service: Urology;  Laterality: Left;       Family History:  Family History   Problem Relation Age of Onset    Liver cancer Brother     Cancer Paternal Aunt     Colon cancer Neg Hx     Malig Hyperthermia Neg Hx     Esophageal cancer Neg Hx     Rectal cancer Neg Hx     Stomach cancer Neg Hx        Social History:   reports that she has never smoked. She has been exposed to tobacco smoke. She has never used smokeless tobacco. She reports that she does not drink alcohol and does not use drugs.    Medications:   Medications Prior to Admission   Medication Sig Dispense Refill Last Dose/Taking    bisoprolol-hydrochlorothiazide (ZIAC) 10-6.25 MG per tablet Take 1 tablet by mouth 2 (Two) Times a Day.   7/24/2025    Calcium Citrate-Vitamin D (Calcium Citrate + D3) 200-6.25 MG-MCG tablet Every Night.   Past Month     Cranberry-Vitamin C (CRANBERRY CONCENTRATE/VITAMINC PO) Daily.   Past Month    escitalopram (LEXAPRO) 10 MG tablet Take 1 tablet by mouth Every Night.   Past Week    folic acid (FOLVITE) 1 MG tablet Take 1 tablet by mouth Daily.   Past Month    Krill Oil 500 MG capsule Take 1 capsule by mouth Daily.   Past Month    levothyroxine sodium (TIROSINT) 88 MCG capsule Take 1 capsule by mouth Daily.   7/24/2025    liothyronine (CYTOMEL) 5 MCG tablet Take 1 tablet by mouth Daily.   7/24/2025    losartan (COZAAR) 100 MG tablet Take 1 tablet by mouth Every Night.   Past Week    Magnesium Citrate 100 MG tablet Take 100 mg by mouth Every Night.   Past Month    multivitamin with minerals (Multivitamin Adult) tablet tablet Take  by mouth.   Past Month    NALTREXONE HCL, PAIN, PO APPLY FOUR CLICKS (ONE GR) EVERY NIGHT AT BEDTIME   Past Month    Potassium 99 MG tablet Take 99 mg by mouth Every Night.   Past Month    saccharomyces boulardii (FLORASTOR) 250 MG capsule Take 1 capsule by mouth Every 12 (Twelve) Hours.   Past Week    Turmeric Curcumin capsule Take  by mouth.   Past Month    CIMETIDINE 200 PO Take 200 mg by mouth As Needed.       HYDROcodone-acetaminophen (NORCO) 5-325 MG per tablet Take 1-2 tablets by mouth Every 4 (Four) Hours As Needed (Pain). 20 tablet 0     PreviDent 5000 Dry Mouth 1.1 % gel APPLY A THIN RIBBON ON TOOTHBRUSH AND BRUSH ON TEETH FOR 3 MINUTES AND SPIT OUT       Tirzepatide (Mounjaro) 5 MG/0.5ML solution auto-injector    7/2/2025 Noon       Allergies:  Sulfa antibiotics        Objective     Blood pressure 129/58, pulse 50, temperature 97.8 °F (36.6 °C), temperature source Temporal, resp. rate 17, weight 75.5 kg (166 lb 7.2 oz), SpO2 99%.    Physical Exam   Constitutional: Pt is oriented to person, place, and time and well-developed, well-nourished, and in no distress.   Mouth/Throat: Oropharynx is clear and moist.   Neck: Normal range of motion.   Cardiovascular: Normal rate, regular rhythm and normal  heart sounds.    Pulmonary/Chest: Effort normal and breath sounds normal.   Abdominal: Soft. Nontender  Skin: Skin is warm and dry.   Psychiatric: Mood, memory, affect and judgment normal.     Assessment & Plan     Diagnosis:  Gerd  Nausea  vomiting    Anticipated Surgical Procedure:  egd    The risks, benefits, and alternatives of this procedure have been discussed with the patient or the responsible party- the patient understands and agrees to proceed.

## 2025-07-24 NOTE — ANESTHESIA POSTPROCEDURE EVALUATION
Patient: Vira Yates    Procedure Summary       Date: 07/24/25 Room / Location: [facility] ENDOSCOPY 3 / [facility] ENDOSCOPY    Anesthesia Start: 1358 Anesthesia Stop: 1423    Procedure: ESOPHAGOGASTRODUODENOSCOPY WITH BIOPSIES Diagnosis:       Nausea      Vomiting, unspecified vomiting type, unspecified whether nausea present      (Nausea [R11.0])      (Vomiting, unspecified vomiting type, unspecified whether nausea present [R11.10])    Surgeons: Idris Conner MD Provider: Evi Doty CRNA    Anesthesia Type: general ASA Status: 3            Anesthesia Type: general    Vitals  Vitals Value Taken Time   /72 07/24/25 14:38   Temp 36.4 °C (97.6 °F) 07/24/25 14:20   Pulse 51 07/24/25 14:40   Resp 15 07/24/25 14:35   SpO2 98 % 07/24/25 14:40   Vitals shown include unfiled device data.        Post Anesthesia Care and Evaluation    Post-procedure mental status: acceptable.  Pain management: satisfactory to patient    Airway patency: patent  Anesthetic complications: No anesthetic complications    Cardiovascular status: acceptable  Respiratory status: acceptable    Comments: Per chart review

## 2025-07-25 ENCOUNTER — TELEPHONE (OUTPATIENT)
Dept: GASTROENTEROLOGY | Facility: CLINIC | Age: 65
End: 2025-07-25
Payer: COMMERCIAL

## 2025-07-25 RX ORDER — ESOMEPRAZOLE MAGNESIUM 40 MG/1
40 CAPSULE, DELAYED RELEASE ORAL DAILY
Qty: 90 CAPSULE | Refills: 2 | Status: SHIPPED | OUTPATIENT
Start: 2025-07-25

## 2025-07-25 NOTE — TELEPHONE ENCOUNTER
Updated prescription sent to pharmacy for patient. 90 days. Nexium. Pt requested [facility].

## 2025-07-25 NOTE — TELEPHONE ENCOUNTER
Hub staff attempted to follow warm transfer process and was unsuccessful     Caller: Vira Yates    Relationship to patient: Self    Best call back number: [phone]    Patient is needing: PT IS CALLING BECAUSE DR RICHTER PRESCRIBED HER NEXIUM BUT IT WAS ONLY FOR 9 DAYS. IT SHOULD BE FOR 90 DAYS. PT WOULD LIKE THE PRESCRIPTION SENT TO [facility]. IF NEEDING TO REACH OUT TO PT AND CAN NOT REACH PT. IT IS OKAY TO LVM.

## 2025-08-04 LAB
COLOR STONE: NORMAL
COM MFR STONE: 80 %
HYDROXYAPATITE: 20 %
SIZE STONE: NORMAL MM
SPEC SOURCE SUBJ: NORMAL
WT STONE: 52 MG

## 2025-08-06 ENCOUNTER — OFFICE VISIT (OUTPATIENT)
Dept: UROLOGY | Age: 65
End: 2025-08-06
Payer: COMMERCIAL

## 2025-08-06 VITALS — WEIGHT: 166 LBS | BODY MASS INDEX: 29.41 KG/M2 | HEIGHT: 63 IN

## 2025-08-06 DIAGNOSIS — N20.0 KIDNEY STONE: Primary | ICD-10-CM

## 2025-08-06 LAB
BILIRUB BLD-MCNC: NEGATIVE MG/DL
CLARITY, POC: CLEAR
COLOR UR: YELLOW
EXPIRATION DATE: NORMAL
GLUCOSE UR STRIP-MCNC: NEGATIVE MG/DL
KETONES UR QL: NEGATIVE
LEUKOCYTE EST, POC: NEGATIVE
Lab: NORMAL
NITRITE UR-MCNC: NEGATIVE MG/ML
PH UR: 8 [PH] (ref 5–8)
PROT UR STRIP-MCNC: NEGATIVE MG/DL
RBC # UR STRIP: NEGATIVE /UL
SP GR UR: 1.01 (ref 1–1.03)
UROBILINOGEN UR QL: NORMAL

## 2025-08-16 LAB
QT INTERVAL: 439 MS
QTC INTERVAL: 440 MS

## 2025-08-22 ENCOUNTER — TRANSCRIBE ORDERS (OUTPATIENT)
Dept: ADMINISTRATIVE | Facility: HOSPITAL | Age: 65
End: 2025-08-22
Payer: COMMERCIAL

## 2025-08-22 DIAGNOSIS — Z12.31 ENCOUNTER FOR SCREENING MAMMOGRAM FOR BREAST CANCER: Primary | ICD-10-CM

## (undated) DEVICE — Device

## (undated) DEVICE — BASIC SINGLE BASIN-LF: Brand: MEDLINE INDUSTRIES, INC.

## (undated) DEVICE — NITINOL STONE RETRIEVAL BASKET: Brand: ESCAPE

## (undated) DEVICE — SOL IRRG H2O PL/BG 1000ML STRL

## (undated) DEVICE — DEFENDO AIR WATER SUCTION AND BIOPSY VALVE KIT: Brand: DEFENDO AIR/WATER/SUCTION AND BIOPSY VALVE

## (undated) DEVICE — SOLIDIFIER LIQLOC PLS 1500CC BT

## (undated) DEVICE — SYS IRR PUMP SGL ACTN VAC SYR 10CC

## (undated) DEVICE — SINGLE-USE BIOPSY FORCEPS: Brand: RADIAL JAW 4

## (undated) DEVICE — SET, IRRIGATION CYSTO, Y-TYPE, 81": Brand: MEDLINE

## (undated) DEVICE — THE STERILE LIGHT HANDLE COVER IS USED WITH STERIS SURGICAL LIGHTING AND VISUALIZATION SYSTEMS.

## (undated) DEVICE — CATH 2L URETRL HC 6F 50CM

## (undated) DEVICE — CYSTO PACK: Brand: MEDLINE INDUSTRIES, INC.

## (undated) DEVICE — LINER SURG CANSTR SXN S/RIGD 1500CC

## (undated) DEVICE — SKIN PREP TRAY W/CHG: Brand: MEDLINE INDUSTRIES, INC.

## (undated) DEVICE — GW ZIPWIRE STD/SHFT ANG TPR/3CM .038X150CM

## (undated) DEVICE — CONN JET HYDRA H20 AUXILIARY DISP

## (undated) DEVICE — ENDOSCOPIC VALVE WITH ADAPTER.: Brand: SURSEAL® II

## (undated) DEVICE — THE SINGLE USE ETRAP – POLYP TRAP IS USED FOR SUCTION RETRIEVAL OF ENDOSCOPICALLY REMOVED POLYPS.: Brand: ETRAP

## (undated) DEVICE — GW ZIPWIRE STD/SHFT STR TPR/3CM .038IN 150CM

## (undated) DEVICE — URETERAL ACCESS SHEATH SET: Brand: NAVIGATOR HD

## (undated) DEVICE — TUBING, SUCTION, 1/4" X 10', STRAIGHT: Brand: MEDLINE

## (undated) DEVICE — FIBR LASR MOSES 200 DFL 2J 80HZ

## (undated) DEVICE — Device: Brand: DEFENDO AIR/WATER/SUCTION AND BIOPSY VALVE

## (undated) DEVICE — SOL IRR NACL 0.9PCT 3000ML

## (undated) DEVICE — PAD GRND REM POLYHESIVE A/ DISP

## (undated) DEVICE — GW ZIPWIRE STD/SHFT STR TPR/3CM .035IN 150CM

## (undated) DEVICE — ADAPT UROLOK

## (undated) DEVICE — BLCK/BITE BLOX WO/DENTL/RIM W/STRAP 54F

## (undated) DEVICE — SNAR POLYP CAPTIFLEX XS/OVL 11X2.4MM 240CM 1P/U

## (undated) DEVICE — GLOVE,SURG,SENSICARE SLT,LF,PF,6.5: Brand: MEDLINE